# Patient Record
Sex: MALE | Race: BLACK OR AFRICAN AMERICAN | NOT HISPANIC OR LATINO | Employment: OTHER | RURAL
[De-identification: names, ages, dates, MRNs, and addresses within clinical notes are randomized per-mention and may not be internally consistent; named-entity substitution may affect disease eponyms.]

---

## 2017-10-26 ENCOUNTER — HISTORICAL (OUTPATIENT)
Dept: ADMINISTRATIVE | Facility: HOSPITAL | Age: 62
End: 2017-10-26

## 2017-10-27 LAB
LAB AP CLINICAL INFORMATION: NORMAL
LAB AP COMMENTS: NORMAL
LAB AP DIAGNOSIS - HISTORICAL: NORMAL
LAB AP GROSS PATHOLOGY - HISTORICAL: NORMAL
LAB AP SPECIMEN SUBMITTED - HISTORICAL: NORMAL

## 2018-01-02 ENCOUNTER — HISTORICAL (OUTPATIENT)
Dept: ADMINISTRATIVE | Facility: HOSPITAL | Age: 63
End: 2018-01-02

## 2018-01-04 LAB
LAB AP CAP CHECKLIST - HISTORICAL: NORMAL
LAB AP CLINICAL INFORMATION: NORMAL
LAB AP COMMENTS: NORMAL
LAB AP DIAGNOSIS - HISTORICAL: NORMAL
LAB AP GROSS PATHOLOGY - HISTORICAL: NORMAL
LAB AP SPECIMEN SUBMITTED - HISTORICAL: NORMAL

## 2019-06-19 ENCOUNTER — HISTORICAL (OUTPATIENT)
Dept: ADMINISTRATIVE | Facility: HOSPITAL | Age: 64
End: 2019-06-19

## 2019-06-19 LAB
CRC RECOMMENDATION EXT: NORMAL
CRC RECOMMENDATION EXT: NORMAL

## 2019-06-20 LAB
LAB AP CLINICAL INFORMATION: NORMAL
LAB AP DIAGNOSIS - HISTORICAL: NORMAL
LAB AP GROSS PATHOLOGY - HISTORICAL: NORMAL
LAB AP SPECIMEN SUBMITTED - HISTORICAL: NORMAL

## 2021-05-10 ENCOUNTER — OFFICE VISIT (OUTPATIENT)
Dept: UROLOGY | Facility: CLINIC | Age: 66
End: 2021-05-10
Payer: COMMERCIAL

## 2021-05-10 VITALS
SYSTOLIC BLOOD PRESSURE: 130 MMHG | HEIGHT: 69 IN | DIASTOLIC BLOOD PRESSURE: 78 MMHG | BODY MASS INDEX: 31.1 KG/M2 | HEART RATE: 81 BPM | OXYGEN SATURATION: 99 % | TEMPERATURE: 99 F | WEIGHT: 210 LBS

## 2021-05-10 DIAGNOSIS — R39.9 LOWER URINARY TRACT SYMPTOMS (LUTS): ICD-10-CM

## 2021-05-10 DIAGNOSIS — N52.1 ERECTILE DISORDER DUE TO MEDICAL CONDITION IN MALE: ICD-10-CM

## 2021-05-10 DIAGNOSIS — C61 PROSTATE CANCER: Primary | ICD-10-CM

## 2021-05-10 PROCEDURE — 99213 OFFICE O/P EST LOW 20 MIN: CPT | Mod: S$PBB,,, | Performed by: UROLOGY

## 2021-05-10 PROCEDURE — 99214 HC OFFICE/OUTPT VISIT, EST, LEVL IV, 30-39 MIN: ICD-10-PCS | Mod: PBBFAC,,, | Performed by: UROLOGY

## 2021-05-10 PROCEDURE — 99213 PR OFFICE/OUTPT VISIT, EST, LEVL III, 20-29 MIN: ICD-10-PCS | Mod: S$PBB,,, | Performed by: UROLOGY

## 2021-05-10 PROCEDURE — 99214 OFFICE O/P EST MOD 30 MIN: CPT | Mod: PBBFAC | Performed by: UROLOGY

## 2021-05-10 PROCEDURE — 99214 OFFICE O/P EST MOD 30 MIN: CPT | Mod: PBBFAC,,, | Performed by: UROLOGY

## 2021-05-10 RX ORDER — ROSUVASTATIN CALCIUM 10 MG/1
10 TABLET, COATED ORAL NIGHTLY
COMMUNITY
Start: 2021-03-01 | End: 2021-09-01 | Stop reason: SDUPTHER

## 2021-05-10 RX ORDER — HYDROCHLOROTHIAZIDE 25 MG/1
25 TABLET ORAL DAILY
COMMUNITY
Start: 2021-03-01 | End: 2021-09-01 | Stop reason: SDUPTHER

## 2021-05-10 RX ORDER — AMLODIPINE BESYLATE 10 MG/1
10 TABLET ORAL DAILY
COMMUNITY
Start: 2021-03-01 | End: 2021-09-01 | Stop reason: SDUPTHER

## 2021-05-13 ENCOUNTER — TELEPHONE (OUTPATIENT)
Dept: UROLOGY | Facility: CLINIC | Age: 66
End: 2021-05-13

## 2021-05-13 DIAGNOSIS — C61 PROSTATE CANCER: Primary | ICD-10-CM

## 2021-09-01 ENCOUNTER — OFFICE VISIT (OUTPATIENT)
Dept: FAMILY MEDICINE | Facility: CLINIC | Age: 66
End: 2021-09-01
Payer: COMMERCIAL

## 2021-09-01 VITALS
HEIGHT: 69 IN | TEMPERATURE: 97 F | WEIGHT: 218 LBS | BODY MASS INDEX: 32.29 KG/M2 | DIASTOLIC BLOOD PRESSURE: 78 MMHG | HEART RATE: 79 BPM | SYSTOLIC BLOOD PRESSURE: 133 MMHG | RESPIRATION RATE: 18 BRPM

## 2021-09-01 DIAGNOSIS — I10 ESSENTIAL HYPERTENSION: Primary | ICD-10-CM

## 2021-09-01 DIAGNOSIS — E78.5 HYPERLIPIDEMIA, UNSPECIFIED HYPERLIPIDEMIA TYPE: ICD-10-CM

## 2021-09-01 LAB
ALBUMIN SERPL BCP-MCNC: 4.2 G/DL (ref 3.5–5)
ALBUMIN/GLOB SERPL: 1.1 {RATIO}
ALP SERPL-CCNC: 109 U/L (ref 45–115)
ALT SERPL W P-5'-P-CCNC: 33 U/L (ref 16–61)
ANION GAP SERPL CALCULATED.3IONS-SCNC: 8 MMOL/L (ref 7–16)
AST SERPL W P-5'-P-CCNC: 24 U/L (ref 15–37)
BASOPHILS # BLD AUTO: 0.08 K/UL (ref 0–0.2)
BASOPHILS NFR BLD AUTO: 0.9 % (ref 0–1)
BILIRUB SERPL-MCNC: 0.7 MG/DL (ref 0–1.2)
BUN SERPL-MCNC: 13 MG/DL (ref 7–18)
BUN/CREAT SERPL: 14 (ref 6–20)
CALCIUM SERPL-MCNC: 8.7 MG/DL (ref 8.5–10.1)
CHLORIDE SERPL-SCNC: 106 MMOL/L (ref 98–107)
CHOLEST SERPL-MCNC: 126 MG/DL (ref 0–200)
CHOLEST/HDLC SERPL: 3.2 {RATIO}
CO2 SERPL-SCNC: 30 MMOL/L (ref 21–32)
CREAT SERPL-MCNC: 0.9 MG/DL (ref 0.7–1.3)
CREAT UR-MCNC: 272 MG/DL (ref 39–259)
DIFFERENTIAL METHOD BLD: ABNORMAL
EOSINOPHIL # BLD AUTO: 0.16 K/UL (ref 0–0.5)
EOSINOPHIL NFR BLD AUTO: 1.7 % (ref 1–4)
EOSINOPHIL NFR BLD MANUAL: 2 % (ref 1–4)
ERYTHROCYTE [DISTWIDTH] IN BLOOD BY AUTOMATED COUNT: 13.5 % (ref 11.5–14.5)
GLOBULIN SER-MCNC: 3.7 G/DL (ref 2–4)
GLUCOSE SERPL-MCNC: 119 MG/DL (ref 74–106)
HCT VFR BLD AUTO: 38.3 % (ref 40–54)
HDLC SERPL-MCNC: 39 MG/DL (ref 40–60)
HGB BLD-MCNC: 12 G/DL (ref 13.5–18)
IMM GRANULOCYTES # BLD AUTO: 0.02 K/UL (ref 0–0.04)
IMM GRANULOCYTES NFR BLD: 0.2 % (ref 0–0.4)
LDLC SERPL CALC-MCNC: 54 MG/DL
LDLC/HDLC SERPL: 1.4 {RATIO}
LYMPHOCYTES # BLD AUTO: 4.22 K/UL (ref 1–4.8)
LYMPHOCYTES NFR BLD AUTO: 45.8 % (ref 27–41)
LYMPHOCYTES NFR BLD MANUAL: 43 % (ref 27–41)
MCH RBC QN AUTO: 27.9 PG (ref 27–31)
MCHC RBC AUTO-ENTMCNC: 31.3 G/DL (ref 32–36)
MCV RBC AUTO: 89.1 FL (ref 80–96)
MICROALBUMIN UR-MCNC: 1.5 MG/DL (ref 0–2.8)
MICROALBUMIN/CREAT RATIO PNL UR: 5.5 MG/G (ref 0–30)
MONOCYTES # BLD AUTO: 0.49 K/UL (ref 0–0.8)
MONOCYTES NFR BLD AUTO: 5.3 % (ref 2–6)
MONOCYTES NFR BLD MANUAL: 7 % (ref 2–6)
MPC BLD CALC-MCNC: 11.2 FL (ref 9.4–12.4)
NEUTROPHILS # BLD AUTO: 4.24 K/UL (ref 1.8–7.7)
NEUTROPHILS NFR BLD AUTO: 46.1 % (ref 53–65)
NEUTS SEG NFR BLD MANUAL: 48 % (ref 50–62)
NONHDLC SERPL-MCNC: 87 MG/DL
NRBC # BLD AUTO: 0 X10E3/UL
NRBC, AUTO (.00): 0 %
PLATELET # BLD AUTO: 269 K/UL (ref 150–400)
PLATELET MORPHOLOGY: NORMAL
POTASSIUM SERPL-SCNC: 3.6 MMOL/L (ref 3.5–5.1)
PROT SERPL-MCNC: 7.9 G/DL (ref 6.4–8.2)
RBC # BLD AUTO: 4.3 M/UL (ref 4.6–6.2)
RBC MORPH BLD: NORMAL
REACTIVE LYMPHOCYTES: ABNORMAL
SODIUM SERPL-SCNC: 140 MMOL/L (ref 136–145)
TRIGL SERPL-MCNC: 165 MG/DL (ref 35–150)
VLDLC SERPL-MCNC: 33 MG/DL
WBC # BLD AUTO: 9.21 K/UL (ref 4.5–11)

## 2021-09-01 PROCEDURE — 82043 MICROALBUMIN / CREATININE RATIO URINE: ICD-10-PCS | Mod: QW,,, | Performed by: CLINICAL MEDICAL LABORATORY

## 2021-09-01 PROCEDURE — 85025 COMPLETE CBC W/AUTO DIFF WBC: CPT | Mod: QW,,, | Performed by: CLINICAL MEDICAL LABORATORY

## 2021-09-01 PROCEDURE — 99213 OFFICE O/P EST LOW 20 MIN: CPT | Mod: ,,, | Performed by: NURSE PRACTITIONER

## 2021-09-01 PROCEDURE — 82570 ASSAY OF URINE CREATININE: CPT | Mod: QW,,, | Performed by: CLINICAL MEDICAL LABORATORY

## 2021-09-01 PROCEDURE — 80061 LIPID PANEL: ICD-10-PCS | Mod: QW,,, | Performed by: CLINICAL MEDICAL LABORATORY

## 2021-09-01 PROCEDURE — 85025 CBC WITH DIFFERENTIAL: ICD-10-PCS | Mod: QW,,, | Performed by: CLINICAL MEDICAL LABORATORY

## 2021-09-01 PROCEDURE — 80053 COMPREHEN METABOLIC PANEL: CPT | Mod: QW,,, | Performed by: CLINICAL MEDICAL LABORATORY

## 2021-09-01 PROCEDURE — 82570 MICROALBUMIN / CREATININE RATIO URINE: ICD-10-PCS | Mod: QW,,, | Performed by: CLINICAL MEDICAL LABORATORY

## 2021-09-01 PROCEDURE — 80053 COMPREHENSIVE METABOLIC PANEL: ICD-10-PCS | Mod: QW,,, | Performed by: CLINICAL MEDICAL LABORATORY

## 2021-09-01 PROCEDURE — 80061 LIPID PANEL: CPT | Mod: QW,,, | Performed by: CLINICAL MEDICAL LABORATORY

## 2021-09-01 PROCEDURE — 99213 PR OFFICE/OUTPT VISIT, EST, LEVL III, 20-29 MIN: ICD-10-PCS | Mod: ,,, | Performed by: NURSE PRACTITIONER

## 2021-09-01 PROCEDURE — 82043 UR ALBUMIN QUANTITATIVE: CPT | Mod: QW,,, | Performed by: CLINICAL MEDICAL LABORATORY

## 2021-09-01 RX ORDER — ZOSTER VACCINE RECOMBINANT, ADJUVANTED 50 MCG/0.5
0.5 KIT INTRAMUSCULAR ONCE
Qty: 1 EACH | Refills: 0 | Status: SHIPPED | OUTPATIENT
Start: 2021-09-01 | End: 2021-09-01

## 2021-09-01 RX ORDER — ROSUVASTATIN CALCIUM 10 MG/1
10 TABLET, COATED ORAL NIGHTLY
Qty: 90 TABLET | Refills: 1 | Status: SHIPPED | OUTPATIENT
Start: 2021-09-01 | End: 2022-02-07 | Stop reason: SDUPTHER

## 2021-09-01 RX ORDER — AMLODIPINE BESYLATE 10 MG/1
10 TABLET ORAL DAILY
Qty: 90 TABLET | Refills: 1 | Status: SHIPPED | OUTPATIENT
Start: 2021-09-01 | End: 2022-02-07 | Stop reason: SDUPTHER

## 2021-09-01 RX ORDER — AMLODIPINE BESYLATE 10 MG/1
10 TABLET ORAL DAILY
Qty: 90 TABLET | Refills: 1 | Status: CANCELLED | OUTPATIENT
Start: 2021-09-01

## 2021-09-01 RX ORDER — HYDROCHLOROTHIAZIDE 25 MG/1
25 TABLET ORAL DAILY
Qty: 90 TABLET | Refills: 1 | Status: SHIPPED | OUTPATIENT
Start: 2021-09-01 | End: 2022-02-07 | Stop reason: SDUPTHER

## 2021-09-01 RX ORDER — HYDROCHLOROTHIAZIDE 25 MG/1
25 TABLET ORAL DAILY
Qty: 90 TABLET | Refills: 1 | Status: CANCELLED | OUTPATIENT
Start: 2021-09-01

## 2021-09-01 RX ORDER — ROSUVASTATIN CALCIUM 10 MG/1
10 TABLET, COATED ORAL NIGHTLY
Qty: 90 TABLET | Refills: 1 | Status: CANCELLED | OUTPATIENT
Start: 2021-09-01

## 2021-12-10 DIAGNOSIS — C61 MALIGNANT NEOPLASM OF PROSTATE: Primary | ICD-10-CM

## 2021-12-16 ENCOUNTER — OFFICE VISIT (OUTPATIENT)
Dept: UROLOGY | Facility: CLINIC | Age: 66
End: 2021-12-16

## 2021-12-16 VITALS
WEIGHT: 216 LBS | OXYGEN SATURATION: 99 % | SYSTOLIC BLOOD PRESSURE: 140 MMHG | HEART RATE: 81 BPM | DIASTOLIC BLOOD PRESSURE: 82 MMHG | TEMPERATURE: 99 F | HEIGHT: 69 IN | BODY MASS INDEX: 31.99 KG/M2

## 2021-12-16 DIAGNOSIS — N52.31 ERECTILE DYSFUNCTION AFTER RADICAL PROSTATECTOMY: ICD-10-CM

## 2021-12-16 DIAGNOSIS — C61 CANCER OF PROSTATE: Primary | ICD-10-CM

## 2021-12-16 PROCEDURE — 99214 OFFICE O/P EST MOD 30 MIN: CPT | Mod: PBBFAC | Performed by: NURSE PRACTITIONER

## 2021-12-16 PROCEDURE — 99214 PR OFFICE/OUTPT VISIT, EST, LEVL IV, 30-39 MIN: ICD-10-PCS | Mod: S$PBB,,, | Performed by: NURSE PRACTITIONER

## 2021-12-16 PROCEDURE — 51798 US URINE CAPACITY MEASURE: CPT | Mod: PBBFAC | Performed by: NURSE PRACTITIONER

## 2021-12-16 PROCEDURE — 99214 OFFICE O/P EST MOD 30 MIN: CPT | Mod: S$PBB,,, | Performed by: NURSE PRACTITIONER

## 2021-12-16 RX ORDER — SILDENAFIL 25 MG/1
TABLET, FILM COATED ORAL
Qty: 40 TABLET | Refills: 5 | Status: SHIPPED | OUTPATIENT
Start: 2021-12-16 | End: 2022-02-07 | Stop reason: SDUPTHER

## 2021-12-16 RX ORDER — CALCIUM CARBONATE 500(1250)
1 TABLET ORAL DAILY
Qty: 90 TABLET | Refills: 3 | Status: SHIPPED | OUTPATIENT
Start: 2021-12-16 | End: 2022-02-07 | Stop reason: SDUPTHER

## 2021-12-16 RX ORDER — VIT C/E/ZN/COPPR/LUTEIN/ZEAXAN 250MG-90MG
1000 CAPSULE ORAL DAILY
Qty: 90 CAPSULE | Refills: 3 | Status: SHIPPED | OUTPATIENT
Start: 2021-12-16 | End: 2022-02-07 | Stop reason: SDUPTHER

## 2022-02-07 ENCOUNTER — OFFICE VISIT (OUTPATIENT)
Dept: FAMILY MEDICINE | Facility: CLINIC | Age: 67
End: 2022-02-07
Payer: COMMERCIAL

## 2022-02-07 VITALS
RESPIRATION RATE: 18 BRPM | HEIGHT: 69 IN | DIASTOLIC BLOOD PRESSURE: 75 MMHG | WEIGHT: 222 LBS | BODY MASS INDEX: 32.88 KG/M2 | HEART RATE: 76 BPM | TEMPERATURE: 98 F | SYSTOLIC BLOOD PRESSURE: 136 MMHG

## 2022-02-07 DIAGNOSIS — R73.01 IMPAIRED FASTING GLUCOSE: ICD-10-CM

## 2022-02-07 DIAGNOSIS — N52.31 ERECTILE DYSFUNCTION AFTER RADICAL PROSTATECTOMY: ICD-10-CM

## 2022-02-07 DIAGNOSIS — C61 CANCER OF PROSTATE: ICD-10-CM

## 2022-02-07 DIAGNOSIS — E55.9 VITAMIN D DEFICIENCY: ICD-10-CM

## 2022-02-07 DIAGNOSIS — E78.5 HYPERLIPIDEMIA, UNSPECIFIED HYPERLIPIDEMIA TYPE: ICD-10-CM

## 2022-02-07 DIAGNOSIS — R53.83 FATIGUE, UNSPECIFIED TYPE: ICD-10-CM

## 2022-02-07 DIAGNOSIS — I10 ESSENTIAL HYPERTENSION: Primary | ICD-10-CM

## 2022-02-07 LAB
25(OH)D3 SERPL-MCNC: 30.3 NG/ML
ALBUMIN SERPL BCP-MCNC: 4.2 G/DL (ref 3.5–5)
ALBUMIN/GLOB SERPL: 1.1 {RATIO}
ALP SERPL-CCNC: 94 U/L (ref 45–115)
ALT SERPL W P-5'-P-CCNC: 33 U/L (ref 16–61)
ANION GAP SERPL CALCULATED.3IONS-SCNC: 8 MMOL/L (ref 7–16)
AST SERPL W P-5'-P-CCNC: 20 U/L (ref 15–37)
BASOPHILS # BLD AUTO: 0.08 K/UL (ref 0–0.2)
BASOPHILS NFR BLD AUTO: 0.8 % (ref 0–1)
BILIRUB SERPL-MCNC: 0.6 MG/DL (ref 0–1.2)
BUN SERPL-MCNC: 15 MG/DL (ref 7–18)
BUN/CREAT SERPL: 16 (ref 6–20)
CALCIUM SERPL-MCNC: 9.3 MG/DL (ref 8.5–10.1)
CHLORIDE SERPL-SCNC: 105 MMOL/L (ref 98–107)
CHOLEST SERPL-MCNC: 135 MG/DL (ref 0–200)
CHOLEST/HDLC SERPL: 3.4 {RATIO}
CO2 SERPL-SCNC: 30 MMOL/L (ref 21–32)
CREAT SERPL-MCNC: 0.96 MG/DL (ref 0.7–1.3)
DIFFERENTIAL METHOD BLD: ABNORMAL
EOSINOPHIL # BLD AUTO: 0.18 K/UL (ref 0–0.5)
EOSINOPHIL NFR BLD AUTO: 1.8 % (ref 1–4)
ERYTHROCYTE [DISTWIDTH] IN BLOOD BY AUTOMATED COUNT: 13.9 % (ref 11.5–14.5)
EST. AVERAGE GLUCOSE BLD GHB EST-MCNC: 110 MG/DL
GLOBULIN SER-MCNC: 4 G/DL (ref 2–4)
GLUCOSE SERPL-MCNC: 97 MG/DL (ref 74–106)
HBA1C MFR BLD HPLC: 5.9 % (ref 4.5–6.6)
HCT VFR BLD AUTO: 41.3 % (ref 40–54)
HDLC SERPL-MCNC: 40 MG/DL (ref 40–60)
HGB BLD-MCNC: 12.9 G/DL (ref 13.5–18)
IMM GRANULOCYTES # BLD AUTO: 0.02 K/UL (ref 0–0.04)
IMM GRANULOCYTES NFR BLD: 0.2 % (ref 0–0.4)
LDLC SERPL CALC-MCNC: 68 MG/DL
LDLC/HDLC SERPL: 1.7 {RATIO}
LYMPHOCYTES # BLD AUTO: 4.04 K/UL (ref 1–4.8)
LYMPHOCYTES NFR BLD AUTO: 40.6 % (ref 27–41)
MCH RBC QN AUTO: 28.5 PG (ref 27–31)
MCHC RBC AUTO-ENTMCNC: 31.2 G/DL (ref 32–36)
MCV RBC AUTO: 91.2 FL (ref 80–96)
MONOCYTES # BLD AUTO: 0.55 K/UL (ref 0–0.8)
MONOCYTES NFR BLD AUTO: 5.5 % (ref 2–6)
MPC BLD CALC-MCNC: 11.2 FL (ref 9.4–12.4)
NEUTROPHILS # BLD AUTO: 5.09 K/UL (ref 1.8–7.7)
NEUTROPHILS NFR BLD AUTO: 51.1 % (ref 53–65)
NONHDLC SERPL-MCNC: 95 MG/DL
NRBC # BLD AUTO: 0 X10E3/UL
NRBC, AUTO (.00): 0 %
PLATELET # BLD AUTO: 279 K/UL (ref 150–400)
POTASSIUM SERPL-SCNC: 3.7 MMOL/L (ref 3.5–5.1)
PROT SERPL-MCNC: 8.2 G/DL (ref 6.4–8.2)
RBC # BLD AUTO: 4.53 M/UL (ref 4.6–6.2)
SODIUM SERPL-SCNC: 139 MMOL/L (ref 136–145)
TRIGL SERPL-MCNC: 137 MG/DL (ref 35–150)
TSH SERPL DL<=0.005 MIU/L-ACNC: 0.79 UIU/ML (ref 0.36–3.74)
VLDLC SERPL-MCNC: 27 MG/DL
WBC # BLD AUTO: 9.96 K/UL (ref 4.5–11)

## 2022-02-07 PROCEDURE — 99214 PR OFFICE/OUTPT VISIT, EST, LEVL IV, 30-39 MIN: ICD-10-PCS | Mod: ,,, | Performed by: NURSE PRACTITIONER

## 2022-02-07 PROCEDURE — 3008F PR BODY MASS INDEX (BMI) DOCUMENTED: ICD-10-PCS | Mod: ,,, | Performed by: NURSE PRACTITIONER

## 2022-02-07 PROCEDURE — 82043 MICROALBUMIN / CREATININE RATIO URINE: ICD-10-PCS | Mod: ,,, | Performed by: CLINICAL MEDICAL LABORATORY

## 2022-02-07 PROCEDURE — 84443 TSH: ICD-10-PCS | Mod: ,,, | Performed by: CLINICAL MEDICAL LABORATORY

## 2022-02-07 PROCEDURE — 3288F FALL RISK ASSESSMENT DOCD: CPT | Mod: ,,, | Performed by: NURSE PRACTITIONER

## 2022-02-07 PROCEDURE — 3075F SYST BP GE 130 - 139MM HG: CPT | Mod: ,,, | Performed by: NURSE PRACTITIONER

## 2022-02-07 PROCEDURE — 82570 ASSAY OF URINE CREATININE: CPT | Mod: ,,, | Performed by: CLINICAL MEDICAL LABORATORY

## 2022-02-07 PROCEDURE — 3066F NEPHROPATHY DOC TX: CPT | Mod: ,,, | Performed by: NURSE PRACTITIONER

## 2022-02-07 PROCEDURE — 80053 COMPREHEN METABOLIC PANEL: CPT | Mod: ,,, | Performed by: CLINICAL MEDICAL LABORATORY

## 2022-02-07 PROCEDURE — 80061 LIPID PANEL: ICD-10-PCS | Mod: ,,, | Performed by: CLINICAL MEDICAL LABORATORY

## 2022-02-07 PROCEDURE — 82306 VITAMIN D: ICD-10-PCS | Mod: ,,, | Performed by: CLINICAL MEDICAL LABORATORY

## 2022-02-07 PROCEDURE — 1160F RVW MEDS BY RX/DR IN RCRD: CPT | Mod: ,,, | Performed by: NURSE PRACTITIONER

## 2022-02-07 PROCEDURE — 84443 ASSAY THYROID STIM HORMONE: CPT | Mod: ,,, | Performed by: CLINICAL MEDICAL LABORATORY

## 2022-02-07 PROCEDURE — 1159F PR MEDICATION LIST DOCUMENTED IN MEDICAL RECORD: ICD-10-PCS | Mod: ,,, | Performed by: NURSE PRACTITIONER

## 2022-02-07 PROCEDURE — 85025 CBC WITH DIFFERENTIAL: ICD-10-PCS | Mod: ,,, | Performed by: CLINICAL MEDICAL LABORATORY

## 2022-02-07 PROCEDURE — 1101F PT FALLS ASSESS-DOCD LE1/YR: CPT | Mod: ,,, | Performed by: NURSE PRACTITIONER

## 2022-02-07 PROCEDURE — 82570 MICROALBUMIN / CREATININE RATIO URINE: ICD-10-PCS | Mod: ,,, | Performed by: CLINICAL MEDICAL LABORATORY

## 2022-02-07 PROCEDURE — 80053 COMPREHENSIVE METABOLIC PANEL: ICD-10-PCS | Mod: ,,, | Performed by: CLINICAL MEDICAL LABORATORY

## 2022-02-07 PROCEDURE — 85025 COMPLETE CBC W/AUTO DIFF WBC: CPT | Mod: ,,, | Performed by: CLINICAL MEDICAL LABORATORY

## 2022-02-07 PROCEDURE — 3078F DIAST BP <80 MM HG: CPT | Mod: ,,, | Performed by: NURSE PRACTITIONER

## 2022-02-07 PROCEDURE — 3044F PR MOST RECENT HEMOGLOBIN A1C LEVEL <7.0%: ICD-10-PCS | Mod: ,,, | Performed by: NURSE PRACTITIONER

## 2022-02-07 PROCEDURE — 3061F NEG MICROALBUMINURIA REV: CPT | Mod: ,,, | Performed by: NURSE PRACTITIONER

## 2022-02-07 PROCEDURE — 80061 LIPID PANEL: CPT | Mod: ,,, | Performed by: CLINICAL MEDICAL LABORATORY

## 2022-02-07 PROCEDURE — 83036 HEMOGLOBIN GLYCOSYLATED A1C: CPT | Mod: ,,, | Performed by: CLINICAL MEDICAL LABORATORY

## 2022-02-07 PROCEDURE — 1159F MED LIST DOCD IN RCRD: CPT | Mod: ,,, | Performed by: NURSE PRACTITIONER

## 2022-02-07 PROCEDURE — 3288F PR FALLS RISK ASSESSMENT DOCUMENTED: ICD-10-PCS | Mod: ,,, | Performed by: NURSE PRACTITIONER

## 2022-02-07 PROCEDURE — 83036 HEMOGLOBIN A1C: ICD-10-PCS | Mod: ,,, | Performed by: CLINICAL MEDICAL LABORATORY

## 2022-02-07 PROCEDURE — 3044F HG A1C LEVEL LT 7.0%: CPT | Mod: ,,, | Performed by: NURSE PRACTITIONER

## 2022-02-07 PROCEDURE — 3078F PR MOST RECENT DIASTOLIC BLOOD PRESSURE < 80 MM HG: ICD-10-PCS | Mod: ,,, | Performed by: NURSE PRACTITIONER

## 2022-02-07 PROCEDURE — 3075F PR MOST RECENT SYSTOLIC BLOOD PRESS GE 130-139MM HG: ICD-10-PCS | Mod: ,,, | Performed by: NURSE PRACTITIONER

## 2022-02-07 PROCEDURE — 99214 OFFICE O/P EST MOD 30 MIN: CPT | Mod: ,,, | Performed by: NURSE PRACTITIONER

## 2022-02-07 PROCEDURE — 82043 UR ALBUMIN QUANTITATIVE: CPT | Mod: ,,, | Performed by: CLINICAL MEDICAL LABORATORY

## 2022-02-07 PROCEDURE — 3061F PR NEG MICROALBUMINURIA RESULT DOCUMENTED/REVIEW: ICD-10-PCS | Mod: ,,, | Performed by: NURSE PRACTITIONER

## 2022-02-07 PROCEDURE — 3066F PR DOCUMENTATION OF TREATMENT FOR NEPHROPATHY: ICD-10-PCS | Mod: ,,, | Performed by: NURSE PRACTITIONER

## 2022-02-07 PROCEDURE — 1160F PR REVIEW ALL MEDS BY PRESCRIBER/CLIN PHARMACIST DOCUMENTED: ICD-10-PCS | Mod: ,,, | Performed by: NURSE PRACTITIONER

## 2022-02-07 PROCEDURE — 1101F PR PT FALLS ASSESS DOC 0-1 FALLS W/OUT INJ PAST YR: ICD-10-PCS | Mod: ,,, | Performed by: NURSE PRACTITIONER

## 2022-02-07 PROCEDURE — 3008F BODY MASS INDEX DOCD: CPT | Mod: ,,, | Performed by: NURSE PRACTITIONER

## 2022-02-07 PROCEDURE — 82306 VITAMIN D 25 HYDROXY: CPT | Mod: ,,, | Performed by: CLINICAL MEDICAL LABORATORY

## 2022-02-07 RX ORDER — AMLODIPINE BESYLATE 10 MG/1
10 TABLET ORAL DAILY
Qty: 90 TABLET | Refills: 1 | Status: SHIPPED | OUTPATIENT
Start: 2022-02-07 | End: 2022-07-21 | Stop reason: SDUPTHER

## 2022-02-07 RX ORDER — CALCIUM CARBONATE 500(1250)
1 TABLET ORAL DAILY
Qty: 90 TABLET | Refills: 3 | Status: SHIPPED | OUTPATIENT
Start: 2022-02-07 | End: 2022-07-21 | Stop reason: SDUPTHER

## 2022-02-07 RX ORDER — ROSUVASTATIN CALCIUM 10 MG/1
10 TABLET, COATED ORAL NIGHTLY
Qty: 90 TABLET | Refills: 1 | Status: SHIPPED | OUTPATIENT
Start: 2022-02-07 | End: 2022-07-21 | Stop reason: SDUPTHER

## 2022-02-07 RX ORDER — HYDROCHLOROTHIAZIDE 25 MG/1
25 TABLET ORAL DAILY
Qty: 90 TABLET | Refills: 1 | Status: SHIPPED | OUTPATIENT
Start: 2022-02-07 | End: 2022-07-21 | Stop reason: SDUPTHER

## 2022-02-07 RX ORDER — SILDENAFIL 25 MG/1
TABLET, FILM COATED ORAL
Qty: 40 TABLET | Refills: 5 | Status: SHIPPED | OUTPATIENT
Start: 2022-02-07 | End: 2022-07-21 | Stop reason: SDUPTHER

## 2022-02-07 RX ORDER — VIT C/E/ZN/COPPR/LUTEIN/ZEAXAN 250MG-90MG
1000 CAPSULE ORAL DAILY
Qty: 90 CAPSULE | Refills: 3 | Status: SHIPPED | OUTPATIENT
Start: 2022-02-07 | End: 2022-05-08

## 2022-02-07 NOTE — ASSESSMENT & PLAN NOTE
Lab Results   Component Value Date    CHOL 126 09/01/2021    TRIG 165 (H) 09/01/2021    HDL 39 (L) 09/01/2021    LDLCALC 54 09/01/2021    CHOLHDL 3.2 09/01/2021    NONHDLCHOL 87 09/01/2021       Lab Results   Component Value Date    CHOL 126 09/01/2021    TRIG 165 (H) 09/01/2021    HDL 39 (L) 09/01/2021    LDLCALC 54 09/01/2021    CHOLHDL 3.2 09/01/2021    NONHDLCHOL 87 09/01/2021

## 2022-02-07 NOTE — PROGRESS NOTES
ÁNGEL Benitez   [address] [place] [address]     PATIENT NAME: Kory Dogulas  : 1955  DATE: 22  MRN: [id number]      Billing Provider: ÁNGEL Benitez  Level of Service: SD OFFICE/OUTPT VISIT, EST, LEVL IV, 30-39 MIN  Patient PCP Information     Provider PCP Type    ÁNGEL Benitez General          Reason for Visit / Chief Complaint: Follow-up (6 month follow up need meds refill)       Update PCP  Update Chief Complaint         History of Present Illness / Problem Focused Workflow     Kory Douglas presents to the clinic with Follow-up (6 month follow up need meds refill)     HPI    Review of Systems     Review of Systems   Constitutional: Negative for chills, diaphoresis, fatigue and fever.   HENT: Negative for nasal congestion, dental problem, ear pain and postnasal drip.    Eyes: Negative for visual disturbance.   Respiratory: Negative for shortness of breath and wheezing.    Cardiovascular: Negative for chest pain and palpitations.   Gastrointestinal: Negative for abdominal distention and abdominal pain.   Endocrine: Negative for cold intolerance and heat intolerance.   Genitourinary: Negative for enuresis.   Musculoskeletal: Negative for neck stiffness.   Integumentary:  Negative for pallor and rash.   Neurological: Negative for dizziness, seizures, speech difficulty and weakness.   Psychiatric/Behavioral: Negative for agitation.        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 The patient's tumor invades the bladder neck in the resection margins are positive areas. did not get XRT.    Erectile dysfunction after radical prostatectomy 2021    No meds tried to date Reviewed all methods of ED treatment    Hyperlipidemia     Hypertension        History reviewed. No pertinent surgical history.    Social  "History    reports that he has never smoked. He has never used smokeless tobacco. He reports that he does not drink alcohol and does not use drugs.    Family History  MrBarbara's family history includes No Known Problems in his father and mother.    Medications and Allergies     Medications  No outpatient medications have been marked as taking for the 2/7/22 encounter (Office Visit) with ÁNGEL Benitez.       Allergies  Review of patient's allergies indicates:  No Known Allergies    Physical Examination   /75 (BP Location: Left arm, Patient Position: Sitting, BP Method: Medium (Automatic))   Pulse 76   Temp 98.1 °F (36.7 °C) (Temporal)   Resp 18   Ht 5' 9" (1.753 m)   Wt 100.7 kg (222 lb)   BMI 32.78 kg/m²   Physical Exam  Vitals and nursing note reviewed.   Constitutional:       Appearance: Normal appearance.   HENT:      Head: Normocephalic and atraumatic.      Right Ear: External ear normal.      Left Ear: External ear normal.      Nose: Nose normal.      Mouth/Throat:      Mouth: Mucous membranes are moist.      Pharynx: Oropharynx is clear.   Eyes:      Pupils: Pupils are equal, round, and reactive to light.   Cardiovascular:      Rate and Rhythm: Normal rate and regular rhythm.      Pulses: Normal pulses.      Heart sounds: Normal heart sounds. No murmur heard.  No gallop.    Pulmonary:      Effort: Pulmonary effort is normal.      Breath sounds: Normal breath sounds. No wheezing or rales.   Abdominal:      General: Abdomen is flat. Bowel sounds are normal. There is no distension.      Palpations: Abdomen is soft.      Tenderness: There is no abdominal tenderness.   Musculoskeletal:         General: Normal range of motion.      Cervical back: Normal range of motion and neck supple.   Skin:     General: Skin is warm and dry.      Capillary Refill: Capillary refill takes less than 2 seconds.   Neurological:      General: No focal deficit present.      Mental Status: He is alert and oriented to " person, place, and time.   Psychiatric:         Mood and Affect: Mood normal.         Behavior: Behavior normal.          Assessment and Plan (including Health Maintenance)      Problem List  Smart Sets  Document Outside HM   :    Plan:         Health Maintenance Due   Topic Date Due    TETANUS VACCINE  Never done    Shingles Vaccine (1 of 2) Never done    Pneumococcal Vaccines (Age 65+) (2 of 2 - PCV13) 08/27/2020    Influenza Vaccine (1) Never done    COVID-19 Vaccine (3 - Booster for Moderna series) 10/15/2021       Problem List Items Addressed This Visit        Cardiac/Vascular    Essential hypertension - Primary    Current Assessment & Plan     Blood Pressure Trends are:  BP Readings from Last 6 Encounters:   02/07/22 136/75   12/16/21 (!) 140/82   09/01/21 133/78   05/10/21 130/78       Most Recent Chemistry:  CMP  Lab Results   Component Value Date     09/01/2021    K 3.6 09/01/2021     09/01/2021    CO2 30 09/01/2021     (H) 09/01/2021    BUN 13 09/01/2021    CREATININE 0.90 09/01/2021    CALCIUM 8.7 09/01/2021    PROT 7.9 09/01/2021    ALBUMIN 4.2 09/01/2021    BILITOT 0.7 09/01/2021    ALKPHOS 109 09/01/2021    AST 24 09/01/2021    ALT 33 09/01/2021    ANIONGAP 8 09/01/2021    ESTGFRAFRICA 108 09/01/2021      Blood Pressure Trends are:  BP Readings from Last 6 Encounters:   02/07/22 136/75   12/16/21 (!) 140/82   09/01/21 133/78   05/10/21 130/78       Most Recent Chemistry:  CMP  Lab Results   Component Value Date     09/01/2021    K 3.6 09/01/2021     09/01/2021    CO2 30 09/01/2021     (H) 09/01/2021    BUN 13 09/01/2021    CREATININE 0.90 09/01/2021    CALCIUM 8.7 09/01/2021    PROT 7.9 09/01/2021    ALBUMIN 4.2 09/01/2021    BILITOT 0.7 09/01/2021    ALKPHOS 109 09/01/2021    AST 24 09/01/2021    ALT 33 09/01/2021    ANIONGAP 8 09/01/2021    ESTGFRAFRICA 108 09/01/2021               Relevant Medications    amLODIPine (NORVASC) 10 MG tablet     hydroCHLOROthiazide (HYDRODIURIL) 25 MG tablet    Other Relevant Orders    Comprehensive Metabolic Panel    CBC Auto Differential    Microalbumin/Creatinine Ratio, Urine    Hyperlipidemia    Current Assessment & Plan       Lab Results   Component Value Date    CHOL 126 09/01/2021    TRIG 165 (H) 09/01/2021    HDL 39 (L) 09/01/2021    LDLCALC 54 09/01/2021    CHOLHDL 3.2 09/01/2021    NONHDLCHOL 87 09/01/2021       Lab Results   Component Value Date    CHOL 126 09/01/2021    TRIG 165 (H) 09/01/2021    HDL 39 (L) 09/01/2021    LDLCALC 54 09/01/2021    CHOLHDL 3.2 09/01/2021    NONHDLCHOL 87 09/01/2021            Relevant Medications    rosuvastatin (CRESTOR) 10 MG tablet    Other Relevant Orders    Lipid Panel       Oncology    Cancer of prostate    Overview     ·  radical prostatectomy for Alexandria's 4+4 disease and tertiary pattern of Alexandria's 5 disease. The patient's tumor invades the bladder neck in the resection margins are positive areas.  · did not get XRT.         Relevant Medications    calcium carbonate (OS-SOL) 500 mg calcium (1,250 mg) tablet    cholecalciferol, vitamin D3, (VITAMIN D3) 25 mcg (1,000 unit) capsule       Endocrine    Vitamin D deficiency    Relevant Orders    Vitamin D    Impaired fasting glucose    Relevant Orders    Hemoglobin A1C       Other    Erectile dysfunction after radical prostatectomy (Chronic)    Overview     · No meds tried to date  · Reviewed all methods of ED treatment  · Not currently taking nitrates.         Relevant Medications    sildenafiL (VIAGRA) 25 MG tablet    Fatigue    Relevant Orders    TSH          Health Maintenance Topics with due status: Not Due       Topic Last Completion Date    Colorectal Cancer Screening 08/13/2018    Lipid Panel 09/01/2021       Future Appointments   Date Time Provider Department Center   5/9/2022  1:00 PM AWV NURSE, [facility] FAMILY MEDICINE [facility]   6/17/2022  8:30 AM [facility] [address] [place] [address] Main    6/17/2022  11:00 AM [facility] Main    6/21/2022  8:00 AM ÁNGEL Alba [facility]        Follow up in about 3 months (around 5/7/2022), or if symptoms worsen or fail to improve.        Signature:  ÁNGEL Benitez      [address] [place] [address]    Date of encounter: 2/7/22

## 2022-02-07 NOTE — ASSESSMENT & PLAN NOTE
Blood Pressure Trends are:  BP Readings from Last 6 Encounters:   02/07/22 136/75   12/16/21 (!) 140/82   09/01/21 133/78   05/10/21 130/78       Most Recent Chemistry:  CMP  Lab Results   Component Value Date     09/01/2021    K 3.6 09/01/2021     09/01/2021    CO2 30 09/01/2021     (H) 09/01/2021    BUN 13 09/01/2021    CREATININE 0.90 09/01/2021    CALCIUM 8.7 09/01/2021    PROT 7.9 09/01/2021    ALBUMIN 4.2 09/01/2021    BILITOT 0.7 09/01/2021    ALKPHOS 109 09/01/2021    AST 24 09/01/2021    ALT 33 09/01/2021    ANIONGAP 8 09/01/2021    ESTGFRAFRICA 108 09/01/2021      Blood Pressure Trends are:  BP Readings from Last 6 Encounters:   02/07/22 136/75   12/16/21 (!) 140/82   09/01/21 133/78   05/10/21 130/78       Most Recent Chemistry:  CMP  Lab Results   Component Value Date     09/01/2021    K 3.6 09/01/2021     09/01/2021    CO2 30 09/01/2021     (H) 09/01/2021    BUN 13 09/01/2021    CREATININE 0.90 09/01/2021    CALCIUM 8.7 09/01/2021    PROT 7.9 09/01/2021    ALBUMIN 4.2 09/01/2021    BILITOT 0.7 09/01/2021    ALKPHOS 109 09/01/2021    AST 24 09/01/2021    ALT 33 09/01/2021    ANIONGAP 8 09/01/2021    ESTGFRAFRICA 108 09/01/2021

## 2022-02-08 LAB
CREAT UR-MCNC: 100 MG/DL (ref 39–259)
MICROALBUMIN UR-MCNC: 0.7 MG/DL (ref 0–2.8)
MICROALBUMIN/CREAT RATIO PNL UR: 7 MG/G (ref 0–30)

## 2022-05-03 ENCOUNTER — PATIENT OUTREACH (OUTPATIENT)
Dept: FAMILY MEDICINE | Facility: CLINIC | Age: 67
End: 2022-05-03
Payer: COMMERCIAL

## 2022-05-09 ENCOUNTER — OFFICE VISIT (OUTPATIENT)
Dept: FAMILY MEDICINE | Facility: CLINIC | Age: 67
End: 2022-05-09
Payer: COMMERCIAL

## 2022-05-09 VITALS
OXYGEN SATURATION: 98 % | HEIGHT: 69 IN | SYSTOLIC BLOOD PRESSURE: 138 MMHG | TEMPERATURE: 98 F | HEART RATE: 75 BPM | BODY MASS INDEX: 32.14 KG/M2 | DIASTOLIC BLOOD PRESSURE: 70 MMHG | WEIGHT: 217 LBS | RESPIRATION RATE: 20 BRPM

## 2022-05-09 DIAGNOSIS — E66.3 OVERWEIGHT: ICD-10-CM

## 2022-05-09 DIAGNOSIS — I10 ESSENTIAL HYPERTENSION: Primary | ICD-10-CM

## 2022-05-09 DIAGNOSIS — E78.5 HYPERLIPIDEMIA, UNSPECIFIED HYPERLIPIDEMIA TYPE: ICD-10-CM

## 2022-05-09 DIAGNOSIS — N52.31 ERECTILE DYSFUNCTION AFTER RADICAL PROSTATECTOMY: ICD-10-CM

## 2022-05-09 DIAGNOSIS — Z00.00 ENCOUNTER FOR PREVENTIVE HEALTH EXAMINATION: ICD-10-CM

## 2022-05-09 PROCEDURE — 3078F PR MOST RECENT DIASTOLIC BLOOD PRESSURE < 80 MM HG: ICD-10-PCS | Mod: ,,, | Performed by: NURSE PRACTITIONER

## 2022-05-09 PROCEDURE — 1101F PR PT FALLS ASSESS DOC 0-1 FALLS W/OUT INJ PAST YR: ICD-10-PCS | Mod: ,,, | Performed by: NURSE PRACTITIONER

## 2022-05-09 PROCEDURE — 3075F SYST BP GE 130 - 139MM HG: CPT | Mod: ,,, | Performed by: NURSE PRACTITIONER

## 2022-05-09 PROCEDURE — 1159F PR MEDICATION LIST DOCUMENTED IN MEDICAL RECORD: ICD-10-PCS | Mod: ,,, | Performed by: NURSE PRACTITIONER

## 2022-05-09 PROCEDURE — 1160F PR REVIEW ALL MEDS BY PRESCRIBER/CLIN PHARMACIST DOCUMENTED: ICD-10-PCS | Mod: ,,, | Performed by: NURSE PRACTITIONER

## 2022-05-09 PROCEDURE — G0009 ADMIN PNEUMOCOCCAL VACCINE: HCPCS | Mod: ,,, | Performed by: NURSE PRACTITIONER

## 2022-05-09 PROCEDURE — 3008F BODY MASS INDEX DOCD: CPT | Mod: ,,, | Performed by: NURSE PRACTITIONER

## 2022-05-09 PROCEDURE — 3066F NEPHROPATHY DOC TX: CPT | Mod: ,,, | Performed by: NURSE PRACTITIONER

## 2022-05-09 PROCEDURE — G0009 PNEUMOCOCCAL POLYSACCHARIDE VACCINE 23-VALENT =>2YO SQ IM: ICD-10-PCS | Mod: ,,, | Performed by: NURSE PRACTITIONER

## 2022-05-09 PROCEDURE — 3288F PR FALLS RISK ASSESSMENT DOCUMENTED: ICD-10-PCS | Mod: ,,, | Performed by: NURSE PRACTITIONER

## 2022-05-09 PROCEDURE — 3288F FALL RISK ASSESSMENT DOCD: CPT | Mod: ,,, | Performed by: NURSE PRACTITIONER

## 2022-05-09 PROCEDURE — 1160F RVW MEDS BY RX/DR IN RCRD: CPT | Mod: ,,, | Performed by: NURSE PRACTITIONER

## 2022-05-09 PROCEDURE — 1126F AMNT PAIN NOTED NONE PRSNT: CPT | Mod: ,,, | Performed by: NURSE PRACTITIONER

## 2022-05-09 PROCEDURE — 1101F PT FALLS ASSESS-DOCD LE1/YR: CPT | Mod: ,,, | Performed by: NURSE PRACTITIONER

## 2022-05-09 PROCEDURE — 1126F PR PAIN SEVERITY QUANTIFIED, NO PAIN PRESENT: ICD-10-PCS | Mod: ,,, | Performed by: NURSE PRACTITIONER

## 2022-05-09 PROCEDURE — 3078F DIAST BP <80 MM HG: CPT | Mod: ,,, | Performed by: NURSE PRACTITIONER

## 2022-05-09 PROCEDURE — 3075F PR MOST RECENT SYSTOLIC BLOOD PRESS GE 130-139MM HG: ICD-10-PCS | Mod: ,,, | Performed by: NURSE PRACTITIONER

## 2022-05-09 PROCEDURE — 1159F MED LIST DOCD IN RCRD: CPT | Mod: ,,, | Performed by: NURSE PRACTITIONER

## 2022-05-09 PROCEDURE — 3066F PR DOCUMENTATION OF TREATMENT FOR NEPHROPATHY: ICD-10-PCS | Mod: ,,, | Performed by: NURSE PRACTITIONER

## 2022-05-09 PROCEDURE — 3061F PR NEG MICROALBUMINURIA RESULT DOCUMENTED/REVIEW: ICD-10-PCS | Mod: ,,, | Performed by: NURSE PRACTITIONER

## 2022-05-09 PROCEDURE — 3008F PR BODY MASS INDEX (BMI) DOCUMENTED: ICD-10-PCS | Mod: ,,, | Performed by: NURSE PRACTITIONER

## 2022-05-09 PROCEDURE — 3044F HG A1C LEVEL LT 7.0%: CPT | Mod: ,,, | Performed by: NURSE PRACTITIONER

## 2022-05-09 PROCEDURE — 3044F PR MOST RECENT HEMOGLOBIN A1C LEVEL <7.0%: ICD-10-PCS | Mod: ,,, | Performed by: NURSE PRACTITIONER

## 2022-05-09 PROCEDURE — 90732 PPSV23 VACC 2 YRS+ SUBQ/IM: CPT | Mod: ,,, | Performed by: NURSE PRACTITIONER

## 2022-05-09 PROCEDURE — 3061F NEG MICROALBUMINURIA REV: CPT | Mod: ,,, | Performed by: NURSE PRACTITIONER

## 2022-05-09 PROCEDURE — G0439 PPPS, SUBSEQ VISIT: HCPCS | Mod: ,,, | Performed by: NURSE PRACTITIONER

## 2022-05-09 PROCEDURE — G0439 PR MEDICARE ANNUAL WELLNESS SUBSEQUENT VISIT: ICD-10-PCS | Mod: ,,, | Performed by: NURSE PRACTITIONER

## 2022-05-09 PROCEDURE — 90732 PNEUMOCOCCAL POLYSACCHARIDE VACCINE 23-VALENT =>2YO SQ IM: ICD-10-PCS | Mod: ,,, | Performed by: NURSE PRACTITIONER

## 2022-05-09 NOTE — PROGRESS NOTES
[place] [facility]       PATIENT NAME: Kory Douglas   : 1955    AGE: 66 y.o. DATE: 2022   MRN: [id number]        Reason for Visit / Chief Complaint: Medicare AWV Follow Up ([facility] WELLNESS SUBSEQUENT VISIT)        Kory Dogulas presents for an Subsequent Medicare AWV today.     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 The patient's tumor invades the bladder neck in the resection margins are positive areas. did not get XRT.    Erectile dysfunction after radical prostatectomy 2021    No meds tried to date Reviewed all methods of ED treatment    Hyperlipidemia     Hypertension         Family History   Problem Relation Age of Onset    No Known Problems Mother     No Known Problems Father         Social History     Tobacco Use   Smoking Status Never Smoker   Smokeless Tobacco Never Used      Social History     Substance and Sexual Activity   Alcohol Use Never       Family History   Problem Relation Age of Onset    No Known Problems Mother     No Known Problems Father        Past Surgical History:   Procedure Laterality Date    ADENOIDECTOMY      PROSTATE SURGERY           · Allergies and Current Medications   Review of patient's allergies indicates:  No Known Allergies    Current Outpatient Medications:     amLODIPine (NORVASC) 10 MG tablet, Take 1 tablet (10 mg total) by mouth once daily., Disp: 90 tablet, Rfl: 1    calcium carbonate (OS-SOL) 500 mg calcium (1,250 mg) tablet, Take 1 tablet (500 mg total) by mouth once daily., Disp: 90 tablet, Rfl: 3    hydroCHLOROthiazide (HYDRODIURIL) 25 MG tablet, Take 1 tablet (25 mg total) by mouth once daily., Disp: 90 tablet, Rfl: 1    rosuvastatin (CRESTOR) 10 MG tablet, Take 1  tablet (10 mg total) by mouth every evening., Disp: 90 tablet, Rfl: 1    sildenafiL (VIAGRA) 25 MG tablet, Take 1-5 tabs (25mg - 100 mg) po one hour prior to intercourse on an empty stomach.  Use lowest effective dose., Disp: 40 tablet, Rfl: 5    · Health Risk Assessment   Fall Risk: No   Obesity: BMI 32.05     Advance Directive:  Does not have an advanced directive. Verbal education and written education included in today's AVS.    X Patient is interested in learning more about how to make advanced directives.  I provided them paperwork and offered to discuss this with them.   Depression: PHQ9 -0    HTN: 138/70    T2DM: A1C- 5.9   Tobacco use: Denies tobacco use  STI: Not at risk    Alcohol misuse: Denies alcohol use Cage Score: N/A   Statin Use: Continue statin use. Encouraged heart healthy diet & exercise   Mini Cog: 3/5      · Health Risk Assessment  What is your age?: 65-69  Are you male or female?: Male  During the past four weeks, how much have you been bothered by emotional problems such as feeling anxious, depressed, irritable, sad, or downhearted and blue?: Not at all  During the past five weeks, has your physical and/or emotional health limited your social activities with family, friends, neighbors, or groups?: Not at all  During the past four weeks, how much bodily pain have you generally had?: No pain  During the past four weeks, was someone available to help if you needed and wanted help?: Yes, as much as I wanted  During the past four weeks, what was the hardest physical activity you could do for at least two minutes?: Light  Can you get to places out of walking distance without help?  (For example, can you travel alone on buses or taxis, or drive your own car?): Yes  Can you go shopping for groceries or clothes without someone's help?: Yes  Can you prepare your own meals?: Yes  Can you do your own housework without help?: Yes  Because of any health problems, do you need the help of another person  with your personal care needs such as eating, bathing, dressing, or getting around the house?: No  Can you handle your own money without help?: Yes  During the past four weeks, how would you rate your health in general?: Very good  How have things been going for you during the past four weeks?: Pretty well  Are you having difficulties driving your car?: No  Do you always fasten your seat belt when you are in a car?: Yes, usually  How often in the past four weeks have you been bothered by falling or dizzy when standing up?: Never  How often in the past four weeks have you been bothered by sexual problems?: Sometimes  How often in the past four weeks have you been bothered by trouble eating well?: Never  How often in the past four weeks have you been bothered by teeth or denture problems?: Sometimes  How often in the past four weeks have you been bothered with problems using the telephone?: Never  How often in the past four weeks have you been bothered by tiredness or fatigue?: Never  Have you fallen two or more times in the past year?: No  Are you afraid of falling?: No  Are you a smoker?: No  During the past four weeks, how many drinks of wine, beer, or other alcoholic beverages did you have?: No alcohol at all  Do you exercise for about 20 minutes three or more days a week?: Yes, some of the time  Have you been given any information to help you with hazards in your house that might hurt you?: Yes  Have you been given any information to help you with keeping track of your medications?: Yes  How often do you have trouble taking medicines the way you've been told to take them?: I always take them as prescribed  How confident are you that you can control and manage most of your health problems?: Very confident  What is your race? (Check all that apply.):     · Health Maintenance   Last eye exam: 1.5 years ago   Last CV screen with lipids: 02/07/2022   Diabetes screening with fasting glucose or A1c:  02/07/2022   Colonoscopy: 06/19/2019- Repeat in 5 years   Flu Vaccine: Out of season   Pneumonia vaccines: 08/05/2019; 05/09/2022   COVID vaccine: Had x 4   Hep B vaccine: Not at risk   DEXA: N/A   Last pap/pelvic: N/A   Last Mammogram: N/A   Last PSA screen: 12/10/2021   AAA screening: N/A (once in lifetime for males 65-75 who have smoked > 100 cigarettes in lifetime)  HIV Screeing: N/A  Hepatitis C Screen: 04/26/2021- Nonreactive  Low Dose CT Scan: N/a    Health Maintenance Topics with due status: Not Due       Topic Last Completion Date    Colorectal Cancer Screening 06/19/2019    Lipid Panel 02/07/2022    Pneumococcal Vaccines (Age 65+) 05/09/2022    Influenza Vaccine Not Due     There are no preventive care reminders to display for this patient.     Incontinence  Bowel: No  Bladder: No    Lab results available in Epic or see dates from [facility] above:   Lab Results   Component Value Date    CHOL 135 02/07/2022    CHOL 126 09/01/2021     Lab Results   Component Value Date    HDL 40 02/07/2022    HDL 39 (L) 09/01/2021     Lab Results   Component Value Date    LDLCALC 68 02/07/2022    LDLCALC 54 09/01/2021     Lab Results   Component Value Date    TRIG 137 02/07/2022    TRIG 165 (H) 09/01/2021     Lab Results   Component Value Date    CHOLHDL 3.4 02/07/2022    CHOLHDL 3.2 09/01/2021       Lab Results   Component Value Date    HGBA1C 5.9 02/07/2022       Sodium   Date Value Ref Range Status   02/07/2022 139 136 - 145 mmol/L Final     Potassium   Date Value Ref Range Status   02/07/2022 3.7 3.5 - 5.1 mmol/L Final     Chloride   Date Value Ref Range Status   02/07/2022 105 98 - 107 mmol/L Final     CO2   Date Value Ref Range Status   02/07/2022 30 21 - 32 mmol/L Final     Glucose   Date Value Ref Range Status   02/07/2022 97 74 - 106 mg/dL Final     BUN   Date Value Ref Range Status   02/07/2022 15 7 - 18 mg/dL Final     Creatinine   Date Value Ref Range Status   02/07/2022 0.96 0.70 - 1.30 mg/dL Final     Calcium   Date  "Value Ref Range Status   02/07/2022 9.3 8.5 - 10.1 mg/dL Final     Total Protein   Date Value Ref Range Status   02/07/2022 8.2 6.4 - 8.2 g/dL Final     Albumin   Date Value Ref Range Status   02/07/2022 4.2 3.5 - 5.0 g/dL Final     Bilirubin, Total   Date Value Ref Range Status   02/07/2022 0.6 0.0 - 1.2 mg/dL Final     Alk Phos   Date Value Ref Range Status   02/07/2022 94 45 - 115 U/L Final     AST   Date Value Ref Range Status   02/07/2022 20 15 - 37 U/L Final     ALT   Date Value Ref Range Status   02/07/2022 33 16 - 61 U/L Final     Anion Gap   Date Value Ref Range Status   02/07/2022 8 7 - 16 mmol/L Final     eGFR    Date Value Ref Range Status   09/01/2021 108 >=60 mL/min/1.73m² Final     eGFR   Date Value Ref Range Status   02/07/2022 83 >=60 mL/min/1.73m² Final         Lab Results   Component Value Date    PSA <0.010 12/10/2021    PSA <0.010 05/10/2021       · Care Team   PCP: TANK Arias    Urology: Monica    **See Completed Assessments for Annual Wellness visit within the encounter summary    The following assessments were completed & reviewed:  · Depression Screening  · Cognitive function Screening  · Timed Get Up Test  · Whisper Test  · Vision Screen  · Health Risk Assessment  · Checklist of ADLs and IADLs      Objective  Vitals:    05/09/22 1315   BP: 138/70   Pulse: 75   Resp: 20   Temp: 98 °F (36.7 °C)   TempSrc: Oral   SpO2: 98%   Weight: 98.4 kg (217 lb)   Height: 5' 9" (1.753 m)   PainSc: 0-No pain      Body mass index is 32.05 kg/m².  Ideal body weight: 70.7 kg (155 lb 13.8 oz)       Physical Exam  Vitals and nursing note reviewed.   Constitutional:       Appearance: Normal appearance.   HENT:      Head: Normocephalic and atraumatic.      Right Ear: External ear normal.      Left Ear: External ear normal.      Nose: Nose normal.      Mouth/Throat:      Mouth: Mucous membranes are dry.      Pharynx: Oropharynx is clear.   Eyes:      Pupils: Pupils are equal, round, and " reactive to light.   Cardiovascular:      Rate and Rhythm: Normal rate and regular rhythm.      Pulses: Normal pulses.      Heart sounds: Normal heart sounds. No murmur heard.    No gallop.   Pulmonary:      Effort: Pulmonary effort is normal.      Breath sounds: Normal breath sounds. No wheezing or rales.   Abdominal:      General: Abdomen is flat. Bowel sounds are normal. There is no distension.      Palpations: Abdomen is soft.      Tenderness: There is no abdominal tenderness.   Musculoskeletal:         General: Normal range of motion.      Cervical back: Normal range of motion and neck supple.   Skin:     General: Skin is warm and dry.      Capillary Refill: Capillary refill takes less than 2 seconds.   Neurological:      General: No focal deficit present.      Mental Status: He is alert and oriented to person, place, and time.   Psychiatric:         Mood and Affect: Mood normal.         Behavior: Behavior normal.           Assessment:     1. Essential hypertension    2. Hyperlipidemia, unspecified hyperlipidemia type    3. Erectile dysfunction after radical prostatectomy    4. BMI 32.0-32.9,adult    5. Encounter for preventive health examination    6. Overweight         Plan:    Referrals/Orders:  Boostrix, Pneumovax     Advised to call office if does not hear from anyone with referral appt within 2-3 weeks to check on status of referral. Voiced understanding.    Keep current on appts & continue medications as ordered. Encouraged diet & exercise to decrease weight/BMI.  Go to pharmacy to get TDAP vaccine as ordered.      Discussed and provided with a screening schedule and personal prevention plan in accordance with USPSTF age appropriate recommendations and Medicare screening guidelines.   Education, counseling, and referrals were provided as needed.  After Visit Summary printed and given to patient which includes written education and a list of any referrals if indicated. All education discussed with patient  & handouts given to patient.      F/u plan for yearly AWV.    Signature: PHILLIP ArnoldC

## 2022-05-09 NOTE — PATIENT INSTRUCTIONS
Counseling and Referral of Other Preventative  (Italic type indicates deductible and co-insurance are waived)    Patient Name: Kory Douglas  Today's Date: 5/9/2022    Health Maintenance       Date Due Completion Date    Pneumococcal Vaccines (Age 65+) (3 - PCV) 08/27/2020 8/27/2019    TETANUS VACCINE 11/09/2022 (Originally 6/12/1973) ---    Shingles Vaccine (1 of 2) 05/09/2023 (Originally 6/12/1974) ---    Influenza Vaccine (Season Ended) 09/01/2022 ---    Colorectal Cancer Screening 06/19/2024 6/19/2019    Lipid Panel 02/07/2027 2/7/2022        No orders of the defined types were placed in this encounter.    The following information is provided to all patients.  This information is to help you find resources for any of the problems found today that may be affecting your health:                Living healthy guide: www.[facility].[place].[facility]      Understanding Diabetes: www.diabetes.org      Eating healthy: www.cdc.gov/healthyweight      [facility] home safety checklist: www.cdc.gov/steadi/patient.html      Agency on Aging: www.goea.[place].[facility]      Alcoholics anonymous (AA): www.aa.org      Physical Activity: www.aron.nih.gov/bo5nysu      Tobacco use: www.quitwithusla.org

## 2022-05-13 PROBLEM — E66.3 OVERWEIGHT: Status: ACTIVE | Noted: 2022-05-13

## 2022-05-13 PROBLEM — Z00.00 ENCOUNTER FOR PREVENTIVE HEALTH EXAMINATION: Status: ACTIVE | Noted: 2022-05-13

## 2022-06-17 ENCOUNTER — TELEPHONE (OUTPATIENT)
Dept: UROLOGY | Facility: CLINIC | Age: 67
End: 2022-06-17
Payer: COMMERCIAL

## 2022-06-17 ENCOUNTER — HOSPITAL ENCOUNTER (OUTPATIENT)
Dept: RADIOLOGY | Facility: HOSPITAL | Age: 67
Discharge: HOME | End: 2022-06-17
Attending: NURSE PRACTITIONER
Payer: COMMERCIAL

## 2022-06-17 DIAGNOSIS — C61 CANCER OF PROSTATE: ICD-10-CM

## 2022-06-17 PROCEDURE — A9503 TC99M MEDRONATE: HCPCS

## 2022-06-17 PROCEDURE — 78306 NM BONE SCAN WHOLE BODY: ICD-10-PCS | Mod: 26,,, | Performed by: RADIOLOGY

## 2022-06-17 PROCEDURE — 78306 BONE IMAGING WHOLE BODY: CPT | Mod: 26,,, | Performed by: RADIOLOGY

## 2022-06-17 PROCEDURE — 78306 BONE IMAGING WHOLE BODY: CPT | Mod: TC

## 2022-06-17 NOTE — TELEPHONE ENCOUNTER
----- Message from ÁNGEL Alba sent at 6/17/2022  2:35 PM CDT -----  Increased update of the lower thoracic and lumbar spine than seen on previous study seen.  F/u with PCP for plain films of thoracic and lumbar spine to exclude sclerotic  mass.  F/u with PCP for this imaging and work up.  I called and spoke with pt and relayed the above information to him.  He voiced understanding and says he will follow up with his PCP.

## 2022-06-17 NOTE — PROGRESS NOTES
Increased update of the lower thoracic and lumbar spine than seen on previous study seen.  F/u with PCP for plain films of thoracic and lumbar spine to exclude sclerotic  mass.  F/u with PCP for this imaging and work up.

## 2022-06-21 ENCOUNTER — OFFICE VISIT (OUTPATIENT)
Dept: UROLOGY | Facility: CLINIC | Age: 67
End: 2022-06-21
Payer: COMMERCIAL

## 2022-06-21 VITALS
BODY MASS INDEX: 32.14 KG/M2 | OXYGEN SATURATION: 99 % | WEIGHT: 217 LBS | DIASTOLIC BLOOD PRESSURE: 67 MMHG | SYSTOLIC BLOOD PRESSURE: 135 MMHG | TEMPERATURE: 98 F | HEIGHT: 69 IN | HEART RATE: 71 BPM

## 2022-06-21 DIAGNOSIS — C61 CANCER OF PROSTATE: Primary | ICD-10-CM

## 2022-06-21 DIAGNOSIS — N52.31 ERECTILE DYSFUNCTION AFTER RADICAL PROSTATECTOMY: Chronic | ICD-10-CM

## 2022-06-21 PROCEDURE — 3075F SYST BP GE 130 - 139MM HG: CPT | Mod: CPTII,,, | Performed by: NURSE PRACTITIONER

## 2022-06-21 PROCEDURE — 3075F PR MOST RECENT SYSTOLIC BLOOD PRESS GE 130-139MM HG: ICD-10-PCS | Mod: CPTII,,, | Performed by: NURSE PRACTITIONER

## 2022-06-21 PROCEDURE — 1101F PR PT FALLS ASSESS DOC 0-1 FALLS W/OUT INJ PAST YR: ICD-10-PCS | Mod: CPTII,,, | Performed by: NURSE PRACTITIONER

## 2022-06-21 PROCEDURE — 1160F RVW MEDS BY RX/DR IN RCRD: CPT | Mod: CPTII,,, | Performed by: NURSE PRACTITIONER

## 2022-06-21 PROCEDURE — 1101F PT FALLS ASSESS-DOCD LE1/YR: CPT | Mod: CPTII,,, | Performed by: NURSE PRACTITIONER

## 2022-06-21 PROCEDURE — 3008F PR BODY MASS INDEX (BMI) DOCUMENTED: ICD-10-PCS | Mod: CPTII,,, | Performed by: NURSE PRACTITIONER

## 2022-06-21 PROCEDURE — 99214 OFFICE O/P EST MOD 30 MIN: CPT | Mod: PBBFAC | Performed by: NURSE PRACTITIONER

## 2022-06-21 PROCEDURE — 3078F DIAST BP <80 MM HG: CPT | Mod: CPTII,,, | Performed by: NURSE PRACTITIONER

## 2022-06-21 PROCEDURE — 1160F PR REVIEW ALL MEDS BY PRESCRIBER/CLIN PHARMACIST DOCUMENTED: ICD-10-PCS | Mod: CPTII,,, | Performed by: NURSE PRACTITIONER

## 2022-06-21 PROCEDURE — 3078F PR MOST RECENT DIASTOLIC BLOOD PRESSURE < 80 MM HG: ICD-10-PCS | Mod: CPTII,,, | Performed by: NURSE PRACTITIONER

## 2022-06-21 PROCEDURE — 3066F NEPHROPATHY DOC TX: CPT | Mod: CPTII,,, | Performed by: NURSE PRACTITIONER

## 2022-06-21 PROCEDURE — 99214 PR OFFICE/OUTPT VISIT, EST, LEVL IV, 30-39 MIN: ICD-10-PCS | Mod: S$PBB,,, | Performed by: NURSE PRACTITIONER

## 2022-06-21 PROCEDURE — 1159F MED LIST DOCD IN RCRD: CPT | Mod: CPTII,,, | Performed by: NURSE PRACTITIONER

## 2022-06-21 PROCEDURE — 3288F FALL RISK ASSESSMENT DOCD: CPT | Mod: CPTII,,, | Performed by: NURSE PRACTITIONER

## 2022-06-21 PROCEDURE — 99214 OFFICE O/P EST MOD 30 MIN: CPT | Mod: S$PBB,,, | Performed by: NURSE PRACTITIONER

## 2022-06-21 PROCEDURE — 3288F PR FALLS RISK ASSESSMENT DOCUMENTED: ICD-10-PCS | Mod: CPTII,,, | Performed by: NURSE PRACTITIONER

## 2022-06-21 PROCEDURE — 3044F HG A1C LEVEL LT 7.0%: CPT | Mod: CPTII,,, | Performed by: NURSE PRACTITIONER

## 2022-06-21 PROCEDURE — 1159F PR MEDICATION LIST DOCUMENTED IN MEDICAL RECORD: ICD-10-PCS | Mod: CPTII,,, | Performed by: NURSE PRACTITIONER

## 2022-06-21 PROCEDURE — 3044F PR MOST RECENT HEMOGLOBIN A1C LEVEL <7.0%: ICD-10-PCS | Mod: CPTII,,, | Performed by: NURSE PRACTITIONER

## 2022-06-21 PROCEDURE — 3008F BODY MASS INDEX DOCD: CPT | Mod: CPTII,,, | Performed by: NURSE PRACTITIONER

## 2022-06-21 PROCEDURE — 3066F PR DOCUMENTATION OF TREATMENT FOR NEPHROPATHY: ICD-10-PCS | Mod: CPTII,,, | Performed by: NURSE PRACTITIONER

## 2022-06-21 PROCEDURE — 3061F NEG MICROALBUMINURIA REV: CPT | Mod: CPTII,,, | Performed by: NURSE PRACTITIONER

## 2022-06-21 PROCEDURE — 3061F PR NEG MICROALBUMINURIA RESULT DOCUMENTED/REVIEW: ICD-10-PCS | Mod: CPTII,,, | Performed by: NURSE PRACTITIONER

## 2022-06-21 NOTE — PROGRESS NOTES
Subjective:       Patient ID: Kory Douglas is a 67 y.o. male.    Chief Complaint: Follow-up (6 month f/u with PSA/bone scan results)    UROLOGICAL HISTORY-  MARITZA  psa today. Undetectable.  Doing well w/o complaints.    Previous notes below:    Patient presents for 3 month f/u with PSA prior. His PSA on 02/01/2021 <0.010 undetectable. His numbers have been undetectable. Had surgery January 1, 2019. He got 3 shots every 6 months (18 months of ADT). Had hot flashes with it. Recommend getting back on shots if numbers become detectable and then will refer back to rad/onc.     pathology below    Patient is post radicle 2019. His PSA is 0.010ng/ml on 1/20/2020. 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 The patient's tumor invades the bladder neck in the resection margins are positive areas.    did not get XRT, did get at least 1 ADT dose.     Plan:  3 months with PSA   If PSA is undectable next visit will go to every 6 months.   *no medication changes today.     Last note below:  Patient presents for 3 month f/u with PSA. His PSA on 10/30/2020 <0.010 undetectable. Discussed had aggressive prostate cancer at high risk for recurrence . Recommend continuing to check PSA every 3 months and if gets above 0.2 will need a scan.   Had one dose of hormone therapy-off of for over 6 months. Not taking Casodex either. Reports his hot flashes are better.     Assessment:  ED  CAP  Hx of elevated PSA    Plan:  F/u in 3 months with PSA prior. .       Last note below:  07/29/2020  xxxxxxxxxxxxxxxxxxxxxxxxxxxxxxxxxxxxxxxxxxxxxxxxxxxxxxxxxxxxxxxxxxxx    Mr. Douglas is a previous patient of Irena Seo and Conner, who is here today to establish continued surveillance of his CAP, GS 4+4, s/p surgery and 18 months of ADT.    Patient here for 6 month f/u. Former patient of Dr. Prieto. Voices no complaints.      -follow up 3 months, if  PSA > 0.20 then PET Axium scan. if + then XRT recommendations for salvage. holding off on ADT at this time.     bad hot flashes. patient was not on vitd/ calcium.     psa is undetectable today.   (12/16/2021)------------------------------------------------------------------------      Mr. Douglas has returned for 6 month f/u for surveillance of CAP, GS 8, s/p surgery 1/2019.  History of 18 mos of ADT.   Tumor invades the bladder neck in the resection margins are positive areas.Denies bleeding, wt loss, bone pain, dysuria, or difficulty emptying bladder.  PVR 18.  Recent Bone Scan performed:  Increased update of the lower thoracic and lumbar spine than seen on previous study seen.F/u with PCP for this imaging and work up.  F/u with PCP for plain films of thoracic and lumbar spine to exclude sclerotic  mass.    Lab Results       Component                Value               Date                       CREATININE               0.96                02/07/2022              Review of Systems   Constitutional: Negative.    Eyes: Negative for visual disturbance.   Respiratory: Negative.    Cardiovascular: Negative.    Gastrointestinal: Negative.    Genitourinary: Negative.    Musculoskeletal: Negative.    Skin: Negative.    Allergic/Immunologic: Negative.    Neurological: Negative.    Hematological: Negative.    Psychiatric/Behavioral: Negative.        Objective:      Physical Exam  Vitals and nursing note reviewed.   Constitutional:       General: He is not in acute distress.     Appearance: Normal appearance. He is not ill-appearing, toxic-appearing or diaphoretic.   Eyes:      General: No scleral icterus.  Cardiovascular:      Rate and Rhythm: Normal rate.   Pulmonary:      Effort: Pulmonary effort is normal.   Abdominal:      General: There is no distension.      Palpations: Abdomen is soft.      Tenderness: There is no abdominal tenderness. There is no right CVA tenderness or left CVA tenderness.   Musculoskeletal:       Right lower leg: No edema.      Left lower leg: No edema.   Skin:     General: Skin is warm and dry.      Coloration: Skin is not jaundiced or pale.      Findings: No rash.   Neurological:      Mental Status: He is alert and oriented to person, place, and time. Mental status is at baseline.   Psychiatric:         Mood and Affect: Mood normal.         Behavior: Behavior normal.         Thought Content: Thought content normal.         Judgment: Judgment normal.         Assessment:       1. Cancer of prostate    2. Erectile dysfunction after radical prostatectomy        Plan:       Cancer of prostate  · PSA remains immeasurable at <0.010  · No further treatment at this time.  · Continue surveillance q 6 months with PSA  · Recommend restarting ADT if numbers become detectable and then will refer back to rad/onc.  · Continue vitamin D and C  · Bone Scan done:  Showed Increased update of the lower thoracic and lumbar spine than seen on previous study seen.  patient declines imaging here today before leaving, needs F/u with PCP for plain films of thoracic and lumbar spine to exclude sclerotic  mass.  He elects to F/u with PCP at scheduled visit next month for this imaging and work up.    Erectile dysfunction after radical prostatectomy  · Controlled, continue sildenafil as prescribed.

## 2022-06-21 NOTE — ASSESSMENT & PLAN NOTE
· PSA remains immeasurable at <0.010  · No further treatment at this time.  · Continue surveillance q 6 months with PSA  · Recommend restarting ADT if numbers become detectable and then will refer back to rad/onc.  · Continue vitamin D and C  · Bone Scan done:  Showed Increased update of the lower thoracic and lumbar spine than seen on previous study seen.  patient declines imaging here today before leaving, needs F/u with PCP for plain films of thoracic and lumbar spine to exclude sclerotic  mass.  He elects to F/u with PCP at scheduled visit next month for this imaging and work up.

## 2022-07-21 ENCOUNTER — OFFICE VISIT (OUTPATIENT)
Dept: FAMILY MEDICINE | Facility: CLINIC | Age: 67
End: 2022-07-21
Payer: COMMERCIAL

## 2022-07-21 VITALS
DIASTOLIC BLOOD PRESSURE: 83 MMHG | BODY MASS INDEX: 31.1 KG/M2 | HEIGHT: 69 IN | TEMPERATURE: 98 F | SYSTOLIC BLOOD PRESSURE: 146 MMHG | RESPIRATION RATE: 18 BRPM | WEIGHT: 210 LBS | HEART RATE: 73 BPM

## 2022-07-21 DIAGNOSIS — R73.01 IMPAIRED FASTING GLUCOSE: ICD-10-CM

## 2022-07-21 DIAGNOSIS — E78.5 HYPERLIPIDEMIA, UNSPECIFIED HYPERLIPIDEMIA TYPE: ICD-10-CM

## 2022-07-21 DIAGNOSIS — N52.31 ERECTILE DYSFUNCTION AFTER RADICAL PROSTATECTOMY: ICD-10-CM

## 2022-07-21 DIAGNOSIS — E55.9 VITAMIN D DEFICIENCY: ICD-10-CM

## 2022-07-21 DIAGNOSIS — Z12.5 PROSTATE CANCER SCREENING: ICD-10-CM

## 2022-07-21 DIAGNOSIS — I10 ESSENTIAL HYPERTENSION: ICD-10-CM

## 2022-07-21 DIAGNOSIS — C61 CANCER OF PROSTATE: ICD-10-CM

## 2022-07-21 LAB
ALBUMIN SERPL BCP-MCNC: 4 G/DL (ref 3.5–5)
ALBUMIN/GLOB SERPL: 1.1 {RATIO}
ALP SERPL-CCNC: 76 U/L (ref 45–115)
ALT SERPL W P-5'-P-CCNC: 39 U/L (ref 16–61)
ANION GAP SERPL CALCULATED.3IONS-SCNC: 13 MMOL/L (ref 7–16)
AST SERPL W P-5'-P-CCNC: 24 U/L (ref 15–37)
BASOPHILS # BLD AUTO: 0.07 K/UL (ref 0–0.2)
BASOPHILS NFR BLD AUTO: 0.9 % (ref 0–1)
BILIRUB SERPL-MCNC: 0.7 MG/DL (ref 0–1.2)
BUN SERPL-MCNC: 11 MG/DL (ref 7–18)
BUN/CREAT SERPL: 12 (ref 6–20)
CALCIUM SERPL-MCNC: 8.7 MG/DL (ref 8.5–10.1)
CHLORIDE SERPL-SCNC: 104 MMOL/L (ref 98–107)
CHOLEST SERPL-MCNC: 105 MG/DL (ref 0–200)
CHOLEST/HDLC SERPL: 2.8 {RATIO}
CO2 SERPL-SCNC: 27 MMOL/L (ref 21–32)
CREAT SERPL-MCNC: 0.9 MG/DL (ref 0.7–1.3)
DIFFERENTIAL METHOD BLD: ABNORMAL
EOSINOPHIL # BLD AUTO: 0.16 K/UL (ref 0–0.5)
EOSINOPHIL NFR BLD AUTO: 2.1 % (ref 1–4)
ERYTHROCYTE [DISTWIDTH] IN BLOOD BY AUTOMATED COUNT: 14 % (ref 11.5–14.5)
EST. AVERAGE GLUCOSE BLD GHB EST-MCNC: 100 MG/DL
GLOBULIN SER-MCNC: 3.7 G/DL (ref 2–4)
GLUCOSE SERPL-MCNC: 80 MG/DL (ref 74–106)
HBA1C MFR BLD HPLC: 5.6 % (ref 4.5–6.6)
HCT VFR BLD AUTO: 41.5 % (ref 40–54)
HDLC SERPL-MCNC: 38 MG/DL (ref 40–60)
HGB BLD-MCNC: 13.1 G/DL (ref 13.5–18)
IMM GRANULOCYTES # BLD AUTO: 0.02 K/UL (ref 0–0.04)
IMM GRANULOCYTES NFR BLD: 0.3 % (ref 0–0.4)
LDLC SERPL CALC-MCNC: 45 MG/DL
LDLC/HDLC SERPL: 1.2 {RATIO}
LYMPHOCYTES # BLD AUTO: 3.36 K/UL (ref 1–4.8)
LYMPHOCYTES NFR BLD AUTO: 44.9 % (ref 27–41)
MCH RBC QN AUTO: 28.6 PG (ref 27–31)
MCHC RBC AUTO-ENTMCNC: 31.6 G/DL (ref 32–36)
MCV RBC AUTO: 90.6 FL (ref 80–96)
MONOCYTES # BLD AUTO: 0.45 K/UL (ref 0–0.8)
MONOCYTES NFR BLD AUTO: 6 % (ref 2–6)
MPC BLD CALC-MCNC: 11 FL (ref 9.4–12.4)
NEUTROPHILS # BLD AUTO: 3.42 K/UL (ref 1.8–7.7)
NEUTROPHILS NFR BLD AUTO: 45.8 % (ref 53–65)
NONHDLC SERPL-MCNC: 67 MG/DL
NRBC # BLD AUTO: 0 X10E3/UL
NRBC, AUTO (.00): 0 %
PLATELET # BLD AUTO: 273 K/UL (ref 150–400)
POTASSIUM SERPL-SCNC: 3.9 MMOL/L (ref 3.5–5.1)
PROT SERPL-MCNC: 7.7 G/DL (ref 6.4–8.2)
PSA SERPL-MCNC: <0.01 NG/ML (ref 0–4.1)
RBC # BLD AUTO: 4.58 M/UL (ref 4.6–6.2)
SODIUM SERPL-SCNC: 140 MMOL/L (ref 136–145)
TRIGL SERPL-MCNC: 110 MG/DL (ref 35–150)
VLDLC SERPL-MCNC: 22 MG/DL
WBC # BLD AUTO: 7.48 K/UL (ref 4.5–11)

## 2022-07-21 PROCEDURE — 1159F PR MEDICATION LIST DOCUMENTED IN MEDICAL RECORD: ICD-10-PCS | Mod: ,,, | Performed by: NURSE PRACTITIONER

## 2022-07-21 PROCEDURE — G0103 PSA, SCREENING: ICD-10-PCS | Mod: ,,, | Performed by: CLINICAL MEDICAL LABORATORY

## 2022-07-21 PROCEDURE — 83036 HEMOGLOBIN A1C: ICD-10-PCS | Mod: ,,, | Performed by: CLINICAL MEDICAL LABORATORY

## 2022-07-21 PROCEDURE — 80053 COMPREHEN METABOLIC PANEL: CPT | Mod: ,,, | Performed by: CLINICAL MEDICAL LABORATORY

## 2022-07-21 PROCEDURE — 85025 COMPLETE CBC W/AUTO DIFF WBC: CPT | Mod: ,,, | Performed by: CLINICAL MEDICAL LABORATORY

## 2022-07-21 PROCEDURE — 3077F PR MOST RECENT SYSTOLIC BLOOD PRESSURE >= 140 MM HG: ICD-10-PCS | Mod: ,,, | Performed by: NURSE PRACTITIONER

## 2022-07-21 PROCEDURE — 3077F SYST BP >= 140 MM HG: CPT | Mod: ,,, | Performed by: NURSE PRACTITIONER

## 2022-07-21 PROCEDURE — 1101F PT FALLS ASSESS-DOCD LE1/YR: CPT | Mod: ,,, | Performed by: NURSE PRACTITIONER

## 2022-07-21 PROCEDURE — 3061F NEG MICROALBUMINURIA REV: CPT | Mod: ,,, | Performed by: NURSE PRACTITIONER

## 2022-07-21 PROCEDURE — 80053 COMPREHENSIVE METABOLIC PANEL: ICD-10-PCS | Mod: ,,, | Performed by: CLINICAL MEDICAL LABORATORY

## 2022-07-21 PROCEDURE — 1160F RVW MEDS BY RX/DR IN RCRD: CPT | Mod: ,,, | Performed by: NURSE PRACTITIONER

## 2022-07-21 PROCEDURE — 3079F DIAST BP 80-89 MM HG: CPT | Mod: ,,, | Performed by: NURSE PRACTITIONER

## 2022-07-21 PROCEDURE — 1101F PR PT FALLS ASSESS DOC 0-1 FALLS W/OUT INJ PAST YR: ICD-10-PCS | Mod: ,,, | Performed by: NURSE PRACTITIONER

## 2022-07-21 PROCEDURE — 3061F PR NEG MICROALBUMINURIA RESULT DOCUMENTED/REVIEW: ICD-10-PCS | Mod: ,,, | Performed by: NURSE PRACTITIONER

## 2022-07-21 PROCEDURE — 3288F PR FALLS RISK ASSESSMENT DOCUMENTED: ICD-10-PCS | Mod: ,,, | Performed by: NURSE PRACTITIONER

## 2022-07-21 PROCEDURE — 3044F HG A1C LEVEL LT 7.0%: CPT | Mod: ,,, | Performed by: NURSE PRACTITIONER

## 2022-07-21 PROCEDURE — 83036 HEMOGLOBIN GLYCOSYLATED A1C: CPT | Mod: ,,, | Performed by: CLINICAL MEDICAL LABORATORY

## 2022-07-21 PROCEDURE — 3008F PR BODY MASS INDEX (BMI) DOCUMENTED: ICD-10-PCS | Mod: ,,, | Performed by: NURSE PRACTITIONER

## 2022-07-21 PROCEDURE — 3044F PR MOST RECENT HEMOGLOBIN A1C LEVEL <7.0%: ICD-10-PCS | Mod: ,,, | Performed by: NURSE PRACTITIONER

## 2022-07-21 PROCEDURE — 3066F NEPHROPATHY DOC TX: CPT | Mod: ,,, | Performed by: NURSE PRACTITIONER

## 2022-07-21 PROCEDURE — 3079F PR MOST RECENT DIASTOLIC BLOOD PRESSURE 80-89 MM HG: ICD-10-PCS | Mod: ,,, | Performed by: NURSE PRACTITIONER

## 2022-07-21 PROCEDURE — 1160F PR REVIEW ALL MEDS BY PRESCRIBER/CLIN PHARMACIST DOCUMENTED: ICD-10-PCS | Mod: ,,, | Performed by: NURSE PRACTITIONER

## 2022-07-21 PROCEDURE — 99214 OFFICE O/P EST MOD 30 MIN: CPT | Mod: ,,, | Performed by: NURSE PRACTITIONER

## 2022-07-21 PROCEDURE — 1159F MED LIST DOCD IN RCRD: CPT | Mod: ,,, | Performed by: NURSE PRACTITIONER

## 2022-07-21 PROCEDURE — 3008F BODY MASS INDEX DOCD: CPT | Mod: ,,, | Performed by: NURSE PRACTITIONER

## 2022-07-21 PROCEDURE — 99214 PR OFFICE/OUTPT VISIT, EST, LEVL IV, 30-39 MIN: ICD-10-PCS | Mod: ,,, | Performed by: NURSE PRACTITIONER

## 2022-07-21 PROCEDURE — 3066F PR DOCUMENTATION OF TREATMENT FOR NEPHROPATHY: ICD-10-PCS | Mod: ,,, | Performed by: NURSE PRACTITIONER

## 2022-07-21 PROCEDURE — 3288F FALL RISK ASSESSMENT DOCD: CPT | Mod: ,,, | Performed by: NURSE PRACTITIONER

## 2022-07-21 PROCEDURE — G0103 PSA SCREENING: HCPCS | Mod: ,,, | Performed by: CLINICAL MEDICAL LABORATORY

## 2022-07-21 PROCEDURE — 80061 LIPID PANEL: CPT | Mod: ,,, | Performed by: CLINICAL MEDICAL LABORATORY

## 2022-07-21 PROCEDURE — 80061 LIPID PANEL: ICD-10-PCS | Mod: ,,, | Performed by: CLINICAL MEDICAL LABORATORY

## 2022-07-21 PROCEDURE — 85025 CBC WITH DIFFERENTIAL: ICD-10-PCS | Mod: ,,, | Performed by: CLINICAL MEDICAL LABORATORY

## 2022-07-21 RX ORDER — CALCIUM CARBONATE 500(1250)
1 TABLET ORAL DAILY
Qty: 90 TABLET | Refills: 3 | Status: SHIPPED | OUTPATIENT
Start: 2022-07-21 | End: 2024-01-05 | Stop reason: SDUPTHER

## 2022-07-21 RX ORDER — ROSUVASTATIN CALCIUM 10 MG/1
10 TABLET, COATED ORAL NIGHTLY
Qty: 90 TABLET | Refills: 3 | Status: SHIPPED | OUTPATIENT
Start: 2022-07-21 | End: 2023-01-05 | Stop reason: SDUPTHER

## 2022-07-21 RX ORDER — HYDROCHLOROTHIAZIDE 25 MG/1
25 TABLET ORAL DAILY
Qty: 90 TABLET | Refills: 3 | Status: SHIPPED | OUTPATIENT
Start: 2022-07-21 | End: 2023-01-05 | Stop reason: SDUPTHER

## 2022-07-21 RX ORDER — SILDENAFIL 25 MG/1
TABLET, FILM COATED ORAL
Qty: 40 TABLET | Refills: 5 | Status: SHIPPED | OUTPATIENT
Start: 2022-07-21

## 2022-07-21 RX ORDER — AMLODIPINE BESYLATE 10 MG/1
10 TABLET ORAL DAILY
Qty: 90 TABLET | Refills: 3 | Status: SHIPPED | OUTPATIENT
Start: 2022-07-21 | End: 2023-01-05 | Stop reason: SDUPTHER

## 2022-07-21 NOTE — ASSESSMENT & PLAN NOTE
Lab Results   Component Value Date    CHOL 135 02/07/2022    CHOL 126 09/01/2021    TRIG 137 02/07/2022    TRIG 165 (H) 09/01/2021    HDL 40 02/07/2022    HDL 39 (L) 09/01/2021    LDLCALC 68 02/07/2022    LDLCALC 54 09/01/2021    CHOLHDL 3.4 02/07/2022    CHOLHDL 3.2 09/01/2021    NONHDLCHOL 95 02/07/2022    NONHDLCHOL 87 09/01/2021

## 2022-07-21 NOTE — PROGRESS NOTES
ÁNGEL Benitez   [address] [place] [address]     PATIENT NAME: Kory Douglas  : 1955  DATE: 22  MRN: [id number]      Billing Provider: ÁNGEL Benitez  Level of Service: [place] OFFICE/OUTPT VISIT, EST, LEVL IV, 30-39 MIN  Patient PCP Information     Provider PCP Type    ÁNGEL Benitez General          Reason for Visit / Chief Complaint: Follow-up (6 month follow up)       Update PCP  Update Chief Complaint         History of Present Illness / Problem Focused Workflow     Kory Douglas presents to the clinic with Follow-up (6 month follow up)     HPI    Review of Systems     Review of Systems   Constitutional: Negative for chills, diaphoresis, fatigue and fever.   HENT: Negative for nasal congestion, dental problem, ear pain and postnasal drip.    Eyes: Negative for visual disturbance.   Respiratory: Negative for shortness of breath and wheezing.    Cardiovascular: Negative for chest pain and palpitations.   Gastrointestinal: Negative for abdominal distention and abdominal pain.   Endocrine: Negative for cold intolerance and heat intolerance.   Genitourinary: Negative for enuresis.   Musculoskeletal: Negative for neck stiffness.   Integumentary:  Negative for pallor and rash.   Neurological: Negative for dizziness, seizures, speech difficulty and weakness.   Psychiatric/Behavioral: Negative for agitation.        Medical / Social / Family History     Past Medical History:   Diagnosis Date    Cancer of prostate 2021     radical prostatectomy for Weems's 4+4 disease and tertiary pattern of Weems's 5 disease. The patient's tumor invades the bladder neck in the resection margins are positive areas. did not get XRT.    Erectile dysfunction after radical prostatectomy 2021    No meds tried to date Reviewed all methods of ED treatment    Hyperlipidemia     Hypertension        Past Surgical History:   Procedure Laterality Date    ADENOIDECTOMY      PROSTATE SURGERY    "      Social History    reports that he has never smoked. He has never used smokeless tobacco. He reports that he does not drink alcohol and does not use drugs.    Family History  MrBarbara's family history includes No Known Problems in his father and mother.    PHQ9 5/9/2022   Total Score 0           Medications and Allergies     Medications  No outpatient medications have been marked as taking for the 7/21/22 encounter (Office Visit) with ÁNGEL Benitez.       Allergies  Review of patient's allergies indicates:  No Known Allergies    Physical Examination   BP (!) 146/83 (BP Location: Left arm, Patient Position: Sitting, BP Method: Medium (Automatic))   Pulse 73   Temp 97.8 °F (36.6 °C) (Oral)   Resp 18   Ht 5' 9" (1.753 m)   Wt 95.3 kg (210 lb)   BMI 31.01 kg/m²   Physical Exam  Vitals and nursing note reviewed.   Constitutional:       Appearance: Normal appearance.   HENT:      Head: Normocephalic and atraumatic.      Right Ear: External ear normal.      Left Ear: External ear normal.      Nose: Nose normal.      Mouth/Throat:      Mouth: Mucous membranes are moist.      Pharynx: Oropharynx is clear.   Eyes:      Pupils: Pupils are equal, round, and reactive to light.   Cardiovascular:      Rate and Rhythm: Normal rate and regular rhythm.      Pulses: Normal pulses.      Heart sounds: Normal heart sounds. No murmur heard.    No gallop.   Pulmonary:      Effort: Pulmonary effort is normal.      Breath sounds: Normal breath sounds. No wheezing or rales.   Abdominal:      General: Abdomen is flat. Bowel sounds are normal. There is no distension.      Palpations: Abdomen is soft.      Tenderness: There is no abdominal tenderness.   Musculoskeletal:         General: Normal range of motion.      Cervical back: Normal range of motion and neck supple.   Skin:     General: Skin is warm and dry.      Capillary Refill: Capillary refill takes less than 2 seconds.   Neurological:      General: No focal deficit present. "      Mental Status: He is alert and oriented to person, place, and time.   Psychiatric:         Mood and Affect: Mood normal.         Behavior: Behavior normal.          Assessment and Plan (including Health Maintenance)      Problem List  Smart Sets  Document Outside HM   :    Plan:         There are no preventive care reminders to display for this patient.    Problem List Items Addressed This Visit        Cardiac/Vascular    Essential hypertension    Current Assessment & Plan     Blood Pressure Trends are:  BP Readings from Last 6 Encounters:   07/21/22 (!) 146/83   06/21/22 135/67   05/09/22 138/70   02/07/22 136/75   12/16/21 (!) 140/82   09/01/21 133/78       Most Recent Chemistry:  CMP  Lab Results   Component Value Date     02/07/2022    K 3.7 02/07/2022     02/07/2022    CO2 30 02/07/2022    GLU 97 02/07/2022    BUN 15 02/07/2022    CREATININE 0.96 02/07/2022    CALCIUM 9.3 02/07/2022    PROT 8.2 02/07/2022    ALBUMIN 4.2 02/07/2022    BILITOT 0.6 02/07/2022    ALKPHOS 94 02/07/2022    AST 20 02/07/2022    ALT 33 02/07/2022    ANIONGAP 8 02/07/2022    ESTGFRAFRICA 108 09/01/2021    EGFRNONAA 83 02/07/2022                 Relevant Medications    amLODIPine (NORVASC) 10 MG tablet    hydroCHLOROthiazide (HYDRODIURIL) 25 MG tablet    Other Relevant Orders    Comprehensive Metabolic Panel    Microalbumin/Creatinine Ratio, Urine    CBC Auto Differential    Hyperlipidemia    Current Assessment & Plan       Lab Results   Component Value Date    CHOL 135 02/07/2022    CHOL 126 09/01/2021    TRIG 137 02/07/2022    TRIG 165 (H) 09/01/2021    HDL 40 02/07/2022    HDL 39 (L) 09/01/2021    LDLCALC 68 02/07/2022    LDLCALC 54 09/01/2021    CHOLHDL 3.4 02/07/2022    CHOLHDL 3.2 09/01/2021    NONHDLCHOL 95 02/07/2022    NONHDLCHOL 87 09/01/2021              Relevant Medications    rosuvastatin (CRESTOR) 10 MG tablet    Other Relevant Orders    Lipid Panel       Renal/    Erectile dysfunction after radical  prostatectomy (Chronic)    Overview     · No meds tried to date  · Reviewed all methods of ED treatment  · Not currently taking nitrates.           Relevant Medications    sildenafiL (VIAGRA) 25 MG tablet    Prostate cancer screening    Relevant Orders    PSA, Screening       Oncology    Cancer of prostate    Overview     ·  radical prostatectomy for [place]'s 4+4 disease and tertiary pattern of Clari's 5 disease. The patient's tumor invades the bladder neck in the resection margins are positive areas.  · did not get XRT.           Relevant Medications    calcium carbonate (OS-SOL) 500 mg calcium (1,250 mg) tablet       Endocrine    Vitamin D deficiency    Impaired fasting glucose    Relevant Orders    Hemoglobin A1C          Health Maintenance Topics with due status: Not Due       Topic Last Completion Date    Colorectal Cancer Screening 06/19/2019    Lipid Panel 02/07/2022    Pneumococcal Vaccines (Age 65+) 05/09/2022    Influenza Vaccine Not Due       Future Appointments   Date Time Provider Department Center   12/21/2022  8:00 AM ÁNGEL [facility] UROL [facility]   5/10/2023 11:00 AM AWV NURSE, [facility] FAMILY MEDIC[facility] S[facility] [place]        Follow up in about 3 months (around 10/21/2022), or if symptoms worsen or fail to improve.        Signature:  ÁNGEL Benitez      [address] [place] [address]    Date of encounter: 7/21/22

## 2022-07-21 NOTE — ASSESSMENT & PLAN NOTE
Blood Pressure Trends are:  BP Readings from Last 6 Encounters:   07/21/22 (!) 146/83   06/21/22 135/67   05/09/22 138/70   02/07/22 136/75   12/16/21 (!) 140/82   09/01/21 133/78       Most Recent Chemistry:  CMP  Lab Results   Component Value Date     02/07/2022    K 3.7 02/07/2022     02/07/2022    CO2 30 02/07/2022    GLU 97 02/07/2022    BUN 15 02/07/2022    CREATININE 0.96 02/07/2022    CALCIUM 9.3 02/07/2022    PROT 8.2 02/07/2022    ALBUMIN 4.2 02/07/2022    BILITOT 0.6 02/07/2022    ALKPHOS 94 02/07/2022    AST 20 02/07/2022    ALT 33 02/07/2022    ANIONGAP 8 02/07/2022    ESTGFRAFRICA 108 09/01/2021    EGFRNONAA 83 02/07/2022

## 2022-08-11 ENCOUNTER — TELEPHONE (OUTPATIENT)
Dept: FAMILY MEDICINE | Facility: CLINIC | Age: 67
End: 2022-08-11
Payer: COMMERCIAL

## 2022-08-11 DIAGNOSIS — R94.8 ABNORMAL BONE SCAN OF THORACIC SPINE: ICD-10-CM

## 2022-08-11 DIAGNOSIS — R94.8 ABNORMAL BONE SCAN OF LUMBAR SPINE: Primary | ICD-10-CM

## 2022-08-11 DIAGNOSIS — M54.9 DORSALGIA, UNSPECIFIED: ICD-10-CM

## 2022-08-11 NOTE — TELEPHONE ENCOUNTER
----- Message from ÁNGEL Benitez sent at 8/11/2022  8:18 AM CDT -----  Regarding: RE: abnormal Bone Scan    Would he allow us to set up and MRI to eval his back?  ----- Message -----  From: ÁNGEL Alba  Sent: 8/11/2022   7:09 AM CDT  To: ÁNGEL Benitez  Subject: RE: abnormal Bone Scan                           Absolutely, whatever you feel necessary for f/u on this.  Thank you for staying on this and addressing it with him.  ----- Message -----  From: ÁNGEL Benitez  Sent: 8/10/2022   2:34 PM CDT  To: ÁNGEL Alba  Subject: RE: abnormal Bone Scan                           See message below, would he allow us to order MRI of his back to evaluate?  ----- Message -----  From: ÁNGEL Alba  Sent: 6/21/2022   9:11 AM CDT  To: ÁNGEL Benitez  Subject: abnormal Bone Scan                               Bone Scan done:  Showed Increased update of the lower thoracic and lumbar spine than seen on previous study seen.  patient declines imaging here today before leaving, needs F/u with PCP for plain films of thoracic and lumbar spine to exclude

## 2022-08-11 NOTE — TELEPHONE ENCOUNTER
----- Message from ÁNGEL Benitez sent at 8/11/2022 12:38 PM CDT -----  Regarding: RE: abnormal Bone Scan  Will you call and ask him that?  ----- Message -----  From: Aide Alas RN  Sent: 8/11/2022  12:34 PM CDT  To: ÁGNEL Benitez  Subject: FW: abnormal Bone Scan                             ----- Message -----  From: ÁNGEL Benitez  Sent: 8/11/2022   8:18 AM CDT  To: Aide Alas RN  Subject: RE: abnormal Bone Scan                             Would he allow us to set up and MRI to eval his back?  ----- Message -----  From: ÁNGEL Alba  Sent: 8/11/2022   7:09 AM CDT  To: ÁNGEL Benitez  Subject: RE: abnormal Bone Scan                           Absolutely, whatever you feel necessary for f/u on this.  Thank you for staying on this and addressing it with him.  ----- Message -----  From: ÁNGEL Benitez  Sent: 8/10/2022   2:34 PM CDT  To: ÁNGEL Alba  Subject: RE: abnormal Bone Scan                           See message below, would he allow us to order MRI of his back to evaluate?  ----- Message -----  From: ÁNGEL Alba  Sent: 6/21/2022   9:11 AM CDT  To: ÁNGEL Benitez  Subject: abnormal Bone Scan                               Bone Scan done:  Showed Increased update of the lower thoracic and lumbar spine than seen on previous study seen.  patient declines imaging here today before leaving, needs F/u with PCP for plain films of thoracic and lumbar spine to exclude

## 2022-08-15 PROBLEM — Z00.00 ENCOUNTER FOR PREVENTIVE HEALTH EXAMINATION: Status: RESOLVED | Noted: 2022-05-13 | Resolved: 2022-08-15

## 2022-11-09 DIAGNOSIS — Z71.89 COMPLEX CARE COORDINATION: ICD-10-CM

## 2023-01-03 ENCOUNTER — OFFICE VISIT (OUTPATIENT)
Dept: UROLOGY | Facility: CLINIC | Age: 68
End: 2023-01-03
Payer: COMMERCIAL

## 2023-01-03 VITALS
OXYGEN SATURATION: 97 % | HEIGHT: 69 IN | WEIGHT: 210 LBS | SYSTOLIC BLOOD PRESSURE: 122 MMHG | DIASTOLIC BLOOD PRESSURE: 84 MMHG | TEMPERATURE: 98 F | BODY MASS INDEX: 31.1 KG/M2 | HEART RATE: 80 BPM

## 2023-01-03 DIAGNOSIS — C61 CANCER OF PROSTATE: Primary | ICD-10-CM

## 2023-01-03 DIAGNOSIS — N52.31 ERECTILE DYSFUNCTION AFTER RADICAL PROSTATECTOMY: Chronic | ICD-10-CM

## 2023-01-03 PROCEDURE — 3079F PR MOST RECENT DIASTOLIC BLOOD PRESSURE 80-89 MM HG: ICD-10-PCS | Mod: CPTII,,, | Performed by: NURSE PRACTITIONER

## 2023-01-03 PROCEDURE — 1160F PR REVIEW ALL MEDS BY PRESCRIBER/CLIN PHARMACIST DOCUMENTED: ICD-10-PCS | Mod: CPTII,,, | Performed by: NURSE PRACTITIONER

## 2023-01-03 PROCEDURE — 99214 PR OFFICE/OUTPT VISIT, EST, LEVL IV, 30-39 MIN: ICD-10-PCS | Mod: S$PBB,,, | Performed by: NURSE PRACTITIONER

## 2023-01-03 PROCEDURE — 1160F RVW MEDS BY RX/DR IN RCRD: CPT | Mod: CPTII,,, | Performed by: NURSE PRACTITIONER

## 2023-01-03 PROCEDURE — 3074F PR MOST RECENT SYSTOLIC BLOOD PRESSURE < 130 MM HG: ICD-10-PCS | Mod: CPTII,,, | Performed by: NURSE PRACTITIONER

## 2023-01-03 PROCEDURE — 3008F BODY MASS INDEX DOCD: CPT | Mod: CPTII,,, | Performed by: NURSE PRACTITIONER

## 2023-01-03 PROCEDURE — 1159F MED LIST DOCD IN RCRD: CPT | Mod: CPTII,,, | Performed by: NURSE PRACTITIONER

## 2023-01-03 PROCEDURE — 3008F PR BODY MASS INDEX (BMI) DOCUMENTED: ICD-10-PCS | Mod: CPTII,,, | Performed by: NURSE PRACTITIONER

## 2023-01-03 PROCEDURE — 3074F SYST BP LT 130 MM HG: CPT | Mod: CPTII,,, | Performed by: NURSE PRACTITIONER

## 2023-01-03 PROCEDURE — 1159F PR MEDICATION LIST DOCUMENTED IN MEDICAL RECORD: ICD-10-PCS | Mod: CPTII,,, | Performed by: NURSE PRACTITIONER

## 2023-01-03 PROCEDURE — 3079F DIAST BP 80-89 MM HG: CPT | Mod: CPTII,,, | Performed by: NURSE PRACTITIONER

## 2023-01-03 PROCEDURE — 99214 OFFICE O/P EST MOD 30 MIN: CPT | Mod: PBBFAC | Performed by: NURSE PRACTITIONER

## 2023-01-03 PROCEDURE — 99214 OFFICE O/P EST MOD 30 MIN: CPT | Mod: S$PBB,,, | Performed by: NURSE PRACTITIONER

## 2023-01-03 NOTE — PROGRESS NOTES
Subjective:       Patient ID: Kory Douglas is a 67 y.o. male.    Chief Complaint: 6 month f/u (- no complaints)    UROLOGICAL HISTORY-  MARITZA  psa today. Undetectable.  Doing well w/o complaints.    Previous notes below:    Patient presents for 3 month f/u with PSA prior. His PSA on 02/01/2021 <0.010 undetectable. His numbers have been undetectable. Had surgery January 1, 2019. He got 3 shots every 6 months (18 months of ADT). Had hot flashes with it. Recommend getting back on shots if numbers become detectable and then will refer back to rad/onc.     pathology below    Patient is post radicle 2019. His PSA is 0.010ng/ml on 1/20/2020. The patient has been with an ongoing inna PSA after radical prostatectomy for Topaz's 4+4 disease and tertiary pattern of Topaz's 5 disease. The patient's tumor invades the bladder neck in the resection margins are positive areas.    did not get XRT, did get at least 1 ADT dose.     Plan:  3 months with PSA   If PSA is undectable next visit will go to every 6 months.   *no medication changes today.     Last note below:  Patient presents for 3 month f/u with PSA. His PSA on 10/30/2020 <0.010 undetectable. Discussed had aggressive prostate cancer at high risk for recurrence . Recommend continuing to check PSA every 3 months and if gets above 0.2 will need a scan.   Had one dose of hormone therapy-off of for over 6 months. Not taking Casodex either. Reports his hot flashes are better.     Assessment:  ED  CAP  Hx of elevated PSA    Plan:  F/u in 3 months with PSA prior. .       Last note below:  07/29/2020  xxxxxxxxxxxxxxxxxxxxxxxxxxxxxxxxxxxxxxxxxxxxxxxxxxxxxxxxxxxxxxxxxxxx    Mr. Douglas is a previous patient of Irena Seo and Conner, who is here today to establish continued surveillance of his CAP, GS 4+4, s/p surgery and 18 months of ADT.    Patient here for 6 month f/u. Former patient of Dr. rPieto. Voices no complaints.      -follow up 3 months, if PSA > 0.20 then PET  Axium scan. if + then XRT recommendations for salvage. holding off on ADT at this time.     bad hot flashes. patient was not on vitd/ calcium.     psa is undetectable today.   [12/16/2021]------------------------------------------------------------------------      Mr. Douglas has returned for 6 month f/u for surveillance of CAP, GS 8, s/p surgery 1/2019.  History of 18 mos of ADT.   Tumor invades the bladder neck in the resection margins are positive areas.Denies bleeding, wt loss, bone pain, dysuria, or difficulty emptying bladder.  PVR 18.  Recent Bone Scan performed:  Increased update of the lower thoracic and lumbar spine than seen on previous study seen.F/u with PCP for this imaging and work up.  F/u with PCP for plain films of thoracic and lumbar spine to exclude sclerotic  mass.    Lab Results       Component                Value               Date                       CREATININE               0.96                02/07/2022        [6/21/2022]--------------------------------------------------------------------------    Mr. Douglas is returning today for 6 month f/u of CAP, GS 8 s/p prostatectomy 1/2019.  18 mos of ADT .  PSA remains immeasurable.  No XRT to date and states he is feeling well.  He denies incontinence.  Denies urinary complaints.       -  Cancer of prostate   PSA remains immeasurable at <0.010   No further treatment indicated at this time.   Continue surveillance q 6 months with PSA   Recommend restarting ADT if numbers become detectable and then will refer back to rad/onc.   Continues to take vitamin D and C   Bone Scan done:  Showed Increased update of the lower thoracic and lumbar spine than seen on previous study seen.  patient declines imaging here today before leaving, needs F/u with PCP for plain films of thoracic and lumbar spine to exclude sclerotic  mass.  He elects to F/u with PCP for this, appt on the 9th.    -  Erectile dysfunction after radical prostatectomy   Reports controlled,  continue sildenafil as prescribed.  [1/3/2023]--------------------------------------------------------------------------       Review of Systems   Constitutional: Negative.    Eyes:  Negative for visual disturbance.   Respiratory: Negative.     Cardiovascular: Negative.    Gastrointestinal: Negative.    Genitourinary: Negative.    Musculoskeletal: Negative.    Skin: Negative.    Allergic/Immunologic: Negative.    Neurological: Negative.    Hematological: Negative.    Psychiatric/Behavioral: Negative.       Objective:      Physical Exam  Vitals and nursing note reviewed.   Constitutional:       General: He is not in acute distress.     Appearance: Normal appearance. He is normal weight. He is not ill-appearing, toxic-appearing or diaphoretic.   Eyes:      General: No scleral icterus.  Cardiovascular:      Rate and Rhythm: Normal rate.   Pulmonary:      Effort: Pulmonary effort is normal.   Abdominal:      General: There is no distension.      Palpations: Abdomen is soft.      Tenderness: There is no abdominal tenderness. There is no right CVA tenderness or left CVA tenderness.   Musculoskeletal:      Right lower leg: No edema.      Left lower leg: No edema.   Skin:     General: Skin is warm and dry.      Coloration: Skin is not jaundiced or pale.      Findings: No rash.   Neurological:      Mental Status: He is alert and oriented to person, place, and time. Mental status is at baseline.   Psychiatric:         Mood and Affect: Mood normal.         Behavior: Behavior normal.         Thought Content: Thought content normal.         Judgment: Judgment normal.       Assessment:       1. Cancer of prostate    2. Erectile dysfunction after radical prostatectomy          Plan:       Cancer of prostate  Lab Results   Component Value Date    PSA <0.010 12/16/2022    PSA <0.010 07/21/2022    PSA <0.010 06/17/2022   PSA REMAINS IMMEASURABLE  Patient requests consult with [facility] for 6 month f/u with PSA

## 2023-01-03 NOTE — ASSESSMENT & PLAN NOTE
Lab Results   Component Value Date    PSA <0.010 12/16/2022    PSA <0.010 07/21/2022    PSA <0.010 06/17/2022   · PSA REMAINS IMMEASURABLE  · Patient requests consult with [facility] for 6 month f/u with PSA

## 2023-01-09 ENCOUNTER — OFFICE VISIT (OUTPATIENT)
Dept: FAMILY MEDICINE | Facility: CLINIC | Age: 68
End: 2023-01-09
Payer: COMMERCIAL

## 2023-01-09 VITALS
TEMPERATURE: 98 F | WEIGHT: 204 LBS | DIASTOLIC BLOOD PRESSURE: 86 MMHG | SYSTOLIC BLOOD PRESSURE: 150 MMHG | HEIGHT: 69 IN | BODY MASS INDEX: 30.21 KG/M2 | RESPIRATION RATE: 18 BRPM | HEART RATE: 78 BPM

## 2023-01-09 DIAGNOSIS — I10 ESSENTIAL HYPERTENSION: Primary | ICD-10-CM

## 2023-01-09 DIAGNOSIS — E78.5 HYPERLIPIDEMIA, UNSPECIFIED HYPERLIPIDEMIA TYPE: ICD-10-CM

## 2023-01-09 LAB
ALBUMIN SERPL BCP-MCNC: 4.2 G/DL (ref 3.5–5)
ALBUMIN/GLOB SERPL: 1.2 {RATIO}
ALP SERPL-CCNC: 70 U/L (ref 45–115)
ALT SERPL W P-5'-P-CCNC: 36 U/L (ref 16–61)
ANION GAP SERPL CALCULATED.3IONS-SCNC: 9 MMOL/L (ref 7–16)
AST SERPL W P-5'-P-CCNC: 26 U/L (ref 15–37)
BILIRUB SERPL-MCNC: 0.7 MG/DL (ref ?–1.2)
BUN SERPL-MCNC: 15 MG/DL (ref 7–18)
BUN/CREAT SERPL: 16 (ref 6–20)
CALCIUM SERPL-MCNC: 9 MG/DL (ref 8.5–10.1)
CHLORIDE SERPL-SCNC: 106 MMOL/L (ref 98–107)
CHOLEST SERPL-MCNC: 112 MG/DL (ref 0–200)
CHOLEST/HDLC SERPL: 2.7 {RATIO}
CO2 SERPL-SCNC: 32 MMOL/L (ref 21–32)
CREAT SERPL-MCNC: 0.94 MG/DL (ref 0.7–1.3)
EGFR (NO RACE VARIABLE) (RUSH/TITUS): 88 ML/MIN/1.73M²
GLOBULIN SER-MCNC: 3.4 G/DL (ref 2–4)
GLUCOSE SERPL-MCNC: 97 MG/DL (ref 74–106)
HDLC SERPL-MCNC: 42 MG/DL (ref 40–60)
LDLC SERPL CALC-MCNC: 50 MG/DL
LDLC/HDLC SERPL: 1.2 {RATIO}
NONHDLC SERPL-MCNC: 70 MG/DL
POTASSIUM SERPL-SCNC: 4.5 MMOL/L (ref 3.5–5.1)
PROT SERPL-MCNC: 7.6 G/DL (ref 6.4–8.2)
SODIUM SERPL-SCNC: 142 MMOL/L (ref 136–145)
TRIGL SERPL-MCNC: 101 MG/DL (ref 35–150)
VLDLC SERPL-MCNC: 20 MG/DL

## 2023-01-09 PROCEDURE — 3079F DIAST BP 80-89 MM HG: CPT | Mod: ,,, | Performed by: NURSE PRACTITIONER

## 2023-01-09 PROCEDURE — 3077F PR MOST RECENT SYSTOLIC BLOOD PRESSURE >= 140 MM HG: ICD-10-PCS | Mod: ,,, | Performed by: NURSE PRACTITIONER

## 2023-01-09 PROCEDURE — 1101F PT FALLS ASSESS-DOCD LE1/YR: CPT | Mod: ,,, | Performed by: NURSE PRACTITIONER

## 2023-01-09 PROCEDURE — 1101F PR PT FALLS ASSESS DOC 0-1 FALLS W/OUT INJ PAST YR: ICD-10-PCS | Mod: ,,, | Performed by: NURSE PRACTITIONER

## 2023-01-09 PROCEDURE — 80053 COMPREHEN METABOLIC PANEL: CPT | Mod: ,,, | Performed by: CLINICAL MEDICAL LABORATORY

## 2023-01-09 PROCEDURE — 3079F PR MOST RECENT DIASTOLIC BLOOD PRESSURE 80-89 MM HG: ICD-10-PCS | Mod: ,,, | Performed by: NURSE PRACTITIONER

## 2023-01-09 PROCEDURE — 1159F PR MEDICATION LIST DOCUMENTED IN MEDICAL RECORD: ICD-10-PCS | Mod: ,,, | Performed by: NURSE PRACTITIONER

## 2023-01-09 PROCEDURE — 3008F PR BODY MASS INDEX (BMI) DOCUMENTED: ICD-10-PCS | Mod: ,,, | Performed by: NURSE PRACTITIONER

## 2023-01-09 PROCEDURE — 1159F MED LIST DOCD IN RCRD: CPT | Mod: ,,, | Performed by: NURSE PRACTITIONER

## 2023-01-09 PROCEDURE — 3288F PR FALLS RISK ASSESSMENT DOCUMENTED: ICD-10-PCS | Mod: ,,, | Performed by: NURSE PRACTITIONER

## 2023-01-09 PROCEDURE — 80053 COMPREHENSIVE METABOLIC PANEL: ICD-10-PCS | Mod: ,,, | Performed by: CLINICAL MEDICAL LABORATORY

## 2023-01-09 PROCEDURE — 80061 LIPID PANEL: CPT | Mod: ,,, | Performed by: CLINICAL MEDICAL LABORATORY

## 2023-01-09 PROCEDURE — 3077F SYST BP >= 140 MM HG: CPT | Mod: ,,, | Performed by: NURSE PRACTITIONER

## 2023-01-09 PROCEDURE — 3008F BODY MASS INDEX DOCD: CPT | Mod: ,,, | Performed by: NURSE PRACTITIONER

## 2023-01-09 PROCEDURE — 80061 LIPID PANEL: ICD-10-PCS | Mod: ,,, | Performed by: CLINICAL MEDICAL LABORATORY

## 2023-01-09 PROCEDURE — 99214 OFFICE O/P EST MOD 30 MIN: CPT | Mod: ,,, | Performed by: NURSE PRACTITIONER

## 2023-01-09 PROCEDURE — 3288F FALL RISK ASSESSMENT DOCD: CPT | Mod: ,,, | Performed by: NURSE PRACTITIONER

## 2023-01-09 PROCEDURE — 99214 PR OFFICE/OUTPT VISIT, EST, LEVL IV, 30-39 MIN: ICD-10-PCS | Mod: ,,, | Performed by: NURSE PRACTITIONER

## 2023-01-09 RX ORDER — AMLODIPINE BESYLATE 10 MG/1
10 TABLET ORAL DAILY
Qty: 90 TABLET | Refills: 1 | Status: SHIPPED | OUTPATIENT
Start: 2023-01-09 | End: 2023-07-07 | Stop reason: SDUPTHER

## 2023-01-09 RX ORDER — ROSUVASTATIN CALCIUM 10 MG/1
10 TABLET, COATED ORAL NIGHTLY
Qty: 90 TABLET | Refills: 1 | Status: SHIPPED | OUTPATIENT
Start: 2023-01-09 | End: 2023-07-07 | Stop reason: SDUPTHER

## 2023-01-09 RX ORDER — HYDROCHLOROTHIAZIDE 25 MG/1
25 TABLET ORAL DAILY
Qty: 90 TABLET | Refills: 1 | Status: SHIPPED | OUTPATIENT
Start: 2023-01-09 | End: 2023-07-07 | Stop reason: SDUPTHER

## 2023-01-10 NOTE — PROGRESS NOTES
DRISS Lowe   [facility] [address] [place] [address]  [phone]      PATIENT NAME: Kory Douglas  : 1955  DATE: 23  MRN: [id number]      Billing Provider: DRISS Lowe  Level of Service:   Patient PCP Information       Provider PCP Type    ÁNGEL Benitez [facility]            Reason for Visit / Chief Complaint: Hypertension, Hyperlipidemia, and Follow-up       Update PCP  Update Chief Complaint         History of Present Illness / Problem Focused Workflow     Kory Douglas presents to the clinic with Hypertension, Hyperlipidemia, and Follow-up     Pt presents for routine follow up with lab and med refills. Overall doing well.     Bp mildly elevated today. Pt reports no meds this am.   Advised to monitor BP at home. Advised on optimal BP readings - SBP < 130 & DBP < 80. Advised to call office for any persistent BP elevation and may have to prescribe or adjust BP med(s).  Recommended DASH diet, stay well hydrated with water daily, eliminate or decrease caffeinated and high calorie drinks, increase physical activity, and lose weight if BMI > 25.0.        Review of Systems     Review of Systems   Constitutional:  Negative for fatigue and fever.   HENT:  Negative for nasal congestion and sore throat.    Eyes:  Negative for visual disturbance.   Respiratory:  Negative for chest tightness and shortness of breath.    Cardiovascular:  Negative for chest pain and leg swelling.   Gastrointestinal:  Negative for abdominal pain, change in bowel habit and change in bowel habit.   Endocrine: Negative for polydipsia, polyphagia and polyuria.   Genitourinary:  Negative for dysuria and hematuria.   Musculoskeletal:  Negative for back pain and leg pain.   Integumentary:  Negative for rash.   Neurological:  Negative for dizziness, syncope, weakness and light-headedness.      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 The patient's tumor invades the bladder neck in the resection margins are positive areas. did not get XRT.    Erectile dysfunction after radical prostatectomy 12/16/2021    No meds tried to date Reviewed all methods of ED treatment    Hyperlipidemia     Hypertension        Past Surgical History:   Procedure Laterality Date    ADENOIDECTOMY      PROSTATE SURGERY         Social History  Mr. Douglas  reports that he has never smoked. He has never used smokeless tobacco. He reports that he does not drink alcohol and does not use drugs.    Family History  Mr. Douglas's family history includes No Known Problems in his father and mother.    Medications and Allergies     Medications  Outpatient Medications Marked as Taking for the 1/9/23 encounter (Office Visit) with DRISS Lowe   Medication Sig Dispense Refill    calcium carbonate (OS-SOL) 500 mg calcium (1,250 mg) tablet Take 1 tablet (500 mg total) by mouth once daily. 90 tablet 3       Allergies  Review of patient's allergies indicates:  No Known Allergies    Physical Examination     Vitals:    01/09/23 0857   BP: (!) 150/86   Pulse: 78   Resp: 18   Temp: 98.1 °F (36.7 °C)     Physical Exam  Eyes:      Pupils: Pupils are equal, round, and reactive to light.   Cardiovascular:      Rate and Rhythm: Normal rate and regular rhythm.      Heart sounds: No murmur heard.  Pulmonary:      Breath sounds: Normal breath sounds. No wheezing, rhonchi or rales.   Abdominal:      General: Bowel sounds are normal.   Musculoskeletal:         General: No swelling.      Cervical back: Normal range of motion and neck supple.   Skin:     General: Skin is warm and dry.   Neurological:      Mental Status: He is alert and oriented to person, place, and time.        Sodium   Date Value Ref Range Status   01/09/2023 142  136 - 145 mmol/L Final     Potassium   Date Value Ref Range Status   01/09/2023 4.5 3.5 - 5.1 mmol/L Final     Chloride   Date Value Ref Range Status   01/09/2023 106 98 - 107 mmol/L Final     CO2   Date Value Ref Range Status   01/09/2023 32 21 - 32 mmol/L Final     Anion Gap   Date Value Ref Range Status   01/09/2023 9 7 - 16 mmol/L Final     Glucose   Date Value Ref Range Status   01/09/2023 97 74 - 106 mg/dL Final     BUN   Date Value Ref Range Status   01/09/2023 15 7 - 18 mg/dL Final     Calcium   Date Value Ref Range Status   01/09/2023 9.0 8.5 - 10.1 mg/dL Final     Total Protein   Date Value Ref Range Status   01/09/2023 7.6 6.4 - 8.2 g/dL Final     Albumin   Date Value Ref Range Status   01/09/2023 4.2 3.5 - 5.0 g/dL Final     A/G Ratio   Date Value Ref Range Status   01/09/2023 1.2  Final     Bilirubin, Total   Date Value Ref Range Status   01/09/2023 0.7 >0.0 - 1.2 mg/dL Final     ALT   Date Value Ref Range Status   01/09/2023 36 16 - 61 U/L Final     AST   Date Value Ref Range Status   01/09/2023 26 15 - 37 U/L Final     eGFR   Date Value Ref Range Status   07/21/2022 89 >=60 mL/min/1.73m² Final     eGFR    Date Value Ref Range Status   09/01/2021 108 >=60 mL/min/1.73m² Final        Cholesterol   Date Value Ref Range Status   01/09/2023 112 0 - 200 mg/dL Final     Comment:       <200 mg/dL:Desirable  200-240 mg/dL:Borderline High  >240 mg/dL:High     HDL Cholesterol   Date Value Ref Range Status   01/09/2023 42 40 - 60 mg/dL Final     Comment:       <40 mg/dL:Low HDL  40-60 mg/dL:Normal  >60 mg/dL:Desirable     LDL Calculated   Date Value Ref Range Status   01/09/2023 50 mg/dL Final     Comment:     Unable to calculate due to one of the following values:  Cholesterol <5  HDL Cholesterol <5  Triglycerides <10 or >400     Triglycerides   Date Value Ref Range Status   01/09/2023 101 35 - 150 mg/dL Final     Comment:       Normal:<150 mg/dL  Borderline High:150-199 mg/dL  High:200-499  mg/dL  Very High:>=500        Hemoglobin A1C   Date Value Ref Range Status   07/21/2022 5.6 4.5 - 6.6 % Final     Comment:       Normal:               <5.7%  Pre-Diabetic:       5.7% to 6.4%  Diabetic:             >6.4%  Diabetic Goal:     <7%        WBC   Date Value Ref Range Status   07/21/2022 7.48 4.50 - 11.00 K/uL Final     Hemoglobin   Date Value Ref Range Status   07/21/2022 13.1 (L) 13.5 - 18.0 g/dL Final     Hematocrit   Date Value Ref Range Status   07/21/2022 41.5 40.0 - 54.0 % Final     Platelet Count   Date Value Ref Range Status   07/21/2022 273 150 - 400 K/uL Final      Assessment and Plan (including Health Maintenance)      Problem List  Smart Sets  Document Outside HM   :    Plan:   Cont home meds  Goal bp 130/80      Health Maintenance Due   Topic Date Due    TETANUS VACCINE  Never done    Influenza Vaccine (1) Never done    Diabetes Urine Screening  02/07/2023       Problem List Items Addressed This Visit          Cardiac/Vascular    Essential hypertension - Primary    Relevant Medications    hydroCHLOROthiazide (HYDRODIURIL) 25 MG tablet    amLODIPine (NORVASC) 10 MG tablet    Other Relevant Orders    Comprehensive Metabolic Panel (Completed)    Hyperlipidemia    Relevant Medications    rosuvastatin (CRESTOR) 10 MG tablet    Other Relevant Orders    Lipid Panel (Completed)       Health Maintenance Topics with due status: Not Due       Topic Last Completion Date    Colorectal Cancer Screening 06/19/2019    Pneumococcal Vaccines (Age 65+) 05/09/2022    Hemoglobin A1c 07/21/2022    PROSTATE-SPECIFIC ANTIGEN 12/16/2022    Lipid Panel 01/09/2023       Future Appointments   Date Time Provider Department Center   5/10/2023 11:00 AM AWKRYSTEN NURSE, [facility]   7/10/2023  8:00 AM DRISS Lowe [facility] [place]            Signature:  DRISS Lowe [facility]  [address] [address]  [phone]    Date of encounter: 1/9/23

## 2023-02-01 ENCOUNTER — PATIENT OUTREACH (OUTPATIENT)
Dept: FAMILY MEDICINE | Facility: CLINIC | Age: 68
End: 2023-02-01
Payer: COMMERCIAL

## 2023-05-04 ENCOUNTER — PATIENT OUTREACH (OUTPATIENT)
Dept: ADMINISTRATIVE | Facility: HOSPITAL | Age: 68
End: 2023-05-04

## 2023-05-04 NOTE — PROGRESS NOTES
Health Maintenance Due   Topic Date Due    Diabetes Urine Screening  02/07/2023    Pneumococcal Vaccines (Age 65+) (3 - PCV) 05/09/2023 05/04/2023   Care Everywhere updates requested and reviewed.  Media reports reviewed.  [facility] and [facility] reviewed.  [employer] reviewed, Shingles Vaccine x1 and Tetanus vaccine x1 uploaded  [facility] EXTRACTED  Next appointment 05/10/2023 . Appointment notes updated to include: LAST BP NONCOMPLIANT-PT IS DUE FOR DM URINE

## 2023-05-10 ENCOUNTER — OFFICE VISIT (OUTPATIENT)
Dept: FAMILY MEDICINE | Facility: CLINIC | Age: 68
End: 2023-05-10
Payer: COMMERCIAL

## 2023-05-10 VITALS
WEIGHT: 195 LBS | OXYGEN SATURATION: 98 % | BODY MASS INDEX: 28.88 KG/M2 | DIASTOLIC BLOOD PRESSURE: 77 MMHG | HEART RATE: 83 BPM | HEIGHT: 69 IN | SYSTOLIC BLOOD PRESSURE: 129 MMHG | RESPIRATION RATE: 20 BRPM

## 2023-05-10 DIAGNOSIS — N52.31 ERECTILE DYSFUNCTION AFTER RADICAL PROSTATECTOMY: ICD-10-CM

## 2023-05-10 DIAGNOSIS — Z00.00 ENCOUNTER FOR SUBSEQUENT ANNUAL WELLNESS VISIT (AWV) IN MEDICARE PATIENT: Primary | ICD-10-CM

## 2023-05-10 DIAGNOSIS — I10 ESSENTIAL HYPERTENSION: ICD-10-CM

## 2023-05-10 DIAGNOSIS — Z00.00 ENCOUNTER FOR PREVENTIVE HEALTH EXAMINATION: ICD-10-CM

## 2023-05-10 DIAGNOSIS — E78.5 HYPERLIPIDEMIA, UNSPECIFIED HYPERLIPIDEMIA TYPE: ICD-10-CM

## 2023-05-10 PROCEDURE — 90677 PNEUMOCOCCAL CONJUGATE VACCINE 20-VALENT: ICD-10-PCS | Mod: ,,, | Performed by: NURSE PRACTITIONER

## 2023-05-10 PROCEDURE — 3008F BODY MASS INDEX DOCD: CPT | Mod: ,,, | Performed by: NURSE PRACTITIONER

## 2023-05-10 PROCEDURE — G0009 ADMIN PNEUMOCOCCAL VACCINE: HCPCS | Mod: ,,, | Performed by: NURSE PRACTITIONER

## 2023-05-10 PROCEDURE — 1101F PT FALLS ASSESS-DOCD LE1/YR: CPT | Mod: ,,, | Performed by: NURSE PRACTITIONER

## 2023-05-10 PROCEDURE — 1160F RVW MEDS BY RX/DR IN RCRD: CPT | Mod: ,,, | Performed by: NURSE PRACTITIONER

## 2023-05-10 PROCEDURE — 1101F PR PT FALLS ASSESS DOC 0-1 FALLS W/OUT INJ PAST YR: ICD-10-PCS | Mod: ,,, | Performed by: NURSE PRACTITIONER

## 2023-05-10 PROCEDURE — G0439 PR MEDICARE ANNUAL WELLNESS SUBSEQUENT VISIT: ICD-10-PCS | Mod: ,,, | Performed by: NURSE PRACTITIONER

## 2023-05-10 PROCEDURE — 1159F PR MEDICATION LIST DOCUMENTED IN MEDICAL RECORD: ICD-10-PCS | Mod: ,,, | Performed by: NURSE PRACTITIONER

## 2023-05-10 PROCEDURE — 90677 PCV20 VACCINE IM: CPT | Mod: ,,, | Performed by: NURSE PRACTITIONER

## 2023-05-10 PROCEDURE — 1160F PR REVIEW ALL MEDS BY PRESCRIBER/CLIN PHARMACIST DOCUMENTED: ICD-10-PCS | Mod: ,,, | Performed by: NURSE PRACTITIONER

## 2023-05-10 PROCEDURE — 3074F SYST BP LT 130 MM HG: CPT | Mod: ,,, | Performed by: NURSE PRACTITIONER

## 2023-05-10 PROCEDURE — 3074F PR MOST RECENT SYSTOLIC BLOOD PRESSURE < 130 MM HG: ICD-10-PCS | Mod: ,,, | Performed by: NURSE PRACTITIONER

## 2023-05-10 PROCEDURE — 3288F FALL RISK ASSESSMENT DOCD: CPT | Mod: ,,, | Performed by: NURSE PRACTITIONER

## 2023-05-10 PROCEDURE — 3008F PR BODY MASS INDEX (BMI) DOCUMENTED: ICD-10-PCS | Mod: ,,, | Performed by: NURSE PRACTITIONER

## 2023-05-10 PROCEDURE — 1159F MED LIST DOCD IN RCRD: CPT | Mod: ,,, | Performed by: NURSE PRACTITIONER

## 2023-05-10 PROCEDURE — G0439 PPPS, SUBSEQ VISIT: HCPCS | Mod: ,,, | Performed by: NURSE PRACTITIONER

## 2023-05-10 PROCEDURE — 3288F PR FALLS RISK ASSESSMENT DOCUMENTED: ICD-10-PCS | Mod: ,,, | Performed by: NURSE PRACTITIONER

## 2023-05-10 PROCEDURE — 3078F PR MOST RECENT DIASTOLIC BLOOD PRESSURE < 80 MM HG: ICD-10-PCS | Mod: ,,, | Performed by: NURSE PRACTITIONER

## 2023-05-10 PROCEDURE — 3078F DIAST BP <80 MM HG: CPT | Mod: ,,, | Performed by: NURSE PRACTITIONER

## 2023-05-10 PROCEDURE — G0009 PNEUMOCOCCAL CONJUGATE VACCINE 20-VALENT: ICD-10-PCS | Mod: ,,, | Performed by: NURSE PRACTITIONER

## 2023-05-10 PROCEDURE — 1126F AMNT PAIN NOTED NONE PRSNT: CPT | Mod: ,,, | Performed by: NURSE PRACTITIONER

## 2023-05-10 PROCEDURE — 1126F PR PAIN SEVERITY QUANTIFIED, NO PAIN PRESENT: ICD-10-PCS | Mod: ,,, | Performed by: NURSE PRACTITIONER

## 2023-05-10 NOTE — PROGRESS NOTES
[facility]       PATIENT NAME: Kory Douglas   : 1955    AGE: 67 y.o. DATE: 05/10/2023   MRN: [id number]        Reason for Visit / Chief Complaint: Medicare AWV Follow Up ([facility] WELLNESS SUBSEQUENT VISIT)        Kory Doulgas presents for an Subsequent Medicare AWV today.     The following components were reviewed and updated:    Medical/Social/Family History:  Past Medical History:   Diagnosis Date    Cancer of prostate 2021     radical prostatectomy for Banks's 4+4 disease and tertiary pattern of Clari's 5 disease. The patient's tumor invades the bladder neck in the resection margins are positive areas. did not get XRT.    Erectile dysfunction after radical prostatectomy 2021    No meds tried to date Reviewed all methods of ED treatment    Hyperlipidemia     Hypertension     Personal history of colonic polyps 2019        Family History   Problem Relation Age of Onset    No Known Problems Mother     No Known Problems Father         Social History     Tobacco Use   Smoking Status Never   Smokeless Tobacco Never      Social History     Substance and Sexual Activity   Alcohol Use Never       Family History   Problem Relation Age of Onset    No Known Problems Mother     No Known Problems Father        Past Surgical History:   Procedure Laterality Date    ADENOIDECTOMY      COLONOSCOPY W/ BIOPSIES  2019    PROSTATE SURGERY           Allergies and Current Medications   Review of patient's allergies indicates:  No Known Allergies    Current Outpatient Medications:     amLODIPine (NORVASC) 10 MG tablet, Take 1 tablet (10 mg total) by mouth once daily., Disp: 90 tablet, Rfl: 1    calcium carbonate (OS-SOL) 500 mg calcium (1,250 mg) tablet, Take 1 tablet (500 mg total) by mouth once daily., Disp: 90 tablet, Rfl: 3    hydroCHLOROthiazide (HYDRODIURIL) 25 MG tablet, Take 1 tablet (25 mg total) by mouth once daily., Disp: 90  tablet, Rfl: 1    rosuvastatin (CRESTOR) 10 MG tablet, Take 1 tablet (10 mg total) by mouth every evening., Disp: 90 tablet, Rfl: 1    sildenafiL (VIAGRA) 25 MG tablet, Take 1-5 tabs (25mg - 100 mg) po one hour prior to intercourse on an empty stomach.  Use lowest effective dose., Disp: 40 tablet, Rfl: 5    Health Risk Assessment   Fall Risk: No   Obesity: BMI 28.80     Advance Directive:  Does not have an advanced directive. Verbal education and written education included in today's AVS.    X Patient is interested in learning more about how to make advanced directives.  I provided them paperwork and offered to discuss this with them.   Depression: PHQ9 -0    HTN: 129/77   T2DM: N/A   Tobacco use: Denies tobacco use  STI: Not at risk    Alcohol misuse: Denies alcohol use Cage Score: N/A   Statin Use: Continue statin use. Encouraged heart healthy diet & exercise   M[employer]      Health Risk Assessment  What is your age?: 65-69  Are you male or female?: Male  During the past four weeks, how much have you been bothered by emotional problems such as feeling anxious, depressed, irritable, sad, or downhearted and blue?: Not at all  During the past five weeks, has your physical and/or emotional health limited your social activities with family, friends, neighbors, or groups?: Not at all  During the past four weeks, how much bodily pain have you generally had?: No pain  During the past four weeks, was someone available to help if you needed and wanted help?: Yes, as much as I wanted  During the past four weeks, what was the hardest physical activity you could do for at least two minutes?: Moderate  Can you get to places out of walking distance without help?  (For example, can you travel alone on buses or taxis, or drive your own car?): Yes  Can you go shopping for groceries or clothes without someone's help?: Yes  Can you prepare your own meals?: Yes  Can you do your own housework without help?: Yes  Because of any  health problems, do you need the help of another person with your personal care needs such as eating, bathing, dressing, or getting around the house?: No  Can you handle your own money without help?: Yes  During the past four weeks, how would you rate your health in general?: Very good  How have things been going for you during the past four weeks?: Pretty well  Are you having difficulties driving your car?: No  Do you always fasten your seat belt when you are in a car?: Yes, usually  How often in the past four weeks have you been bothered by falling or dizzy when standing up?: Never  How often in the past four weeks have you been bothered by sexual problems?: Sometimes  How often in the past four weeks have you been bothered by trouble eating well?: Never  How often in the past four weeks have you been bothered by teeth or denture problems?: Never  How often in the past four weeks have you been bothered with problems using the telephone?: Never  How often in the past four weeks have you been bothered by tiredness or fatigue?: Sometimes  Have you fallen two or more times in the past year?: No  Are you afraid of falling?: No  Are you a smoker?: No  During the past four weeks, how many drinks of wine, beer, or other alcoholic beverages did you have?: No alcohol at all  Do you exercise for about 20 minutes three or more days a week?: Yes, most of the time  Have you been given any information to help you with hazards in your house that might hurt you?: Yes  Have you been given any information to help you with keeping track of your medications?: Yes  How often do you have trouble taking medicines the way you've been told to take them?: I always take them as prescribed  How confident are you that you can control and manage most of your health problems?: Very confident  What is your race? (Check all that apply.):     Opioid Risk Assessment:  Opioid risk assessment performed today. Patient is LOW risk for  opoid abuse.     Opioid Risk Score         Value Time User    Opioid Risk Score  0 5/10/2023 10:47 AM Emi Diego, RN                 Health Maintenance   Last eye exam: > 3 years ago. Recommended to get done this year   Last CV screen with lipids: 01/09/2023   Diabetes screening with fasting glucose or A1c: 01/09/2023   Colonoscopy: 06/09/2019- Repeat in 5 years. Due again in 2024   Flu Vaccine: 10/06/2022   Pneumonia vaccines: 05/09/2022; 05/10/2023   COVID vaccine: 03/18/2021; 04/15/2021; 10/29/2021; 04/28/2022; 10/06/2022   Hep B vaccine: Not at risk   DEXA: N/A   Last pap/pelvic: N/A   Last Mammogram: N/A   Last PSA screen: 12/16/2022- Normal   AAA screening: N/A (once in lifetime for males 65-75 who have smoked > 100 cigarettes in lifetime)  HIV Screeing: N/A  Hepatitis C Screen: 04/26/2022- Nonreactive  Low Dose CT Scan: N/A    Health Maintenance Topics with due status: Not Due       Topic Last Completion Date    Colorectal Cancer Screening 06/19/2019    Hemoglobin A1c (Prediabetes) 07/21/2022    PROSTATE-SPECIFIC ANTIGEN 12/16/2022    Lipid Panel 01/09/2023    TETANUS VACCINE 03/25/2023    Shingles Vaccine 03/25/2023     Health Maintenance Due   Topic Date Due    Pneumococcal Vaccines (Age 65+) (3 - PCV) 05/09/2023       Incontinence  Bowel: No  Bladder: No    Lab results available in Epic or see dates from [facility] above:   Lab Results   Component Value Date    CHOL 112 01/09/2023    CHOL 105 07/21/2022    CHOL 135 02/07/2022     Lab Results   Component Value Date    HDL 42 01/09/2023    HDL 38 (L) 07/21/2022    HDL 40 02/07/2022     Lab Results   Component Value Date    LDLCALC 50 01/09/2023    LDLCALC 45 07/21/2022    LDLCALC 68 02/07/2022     Lab Results   Component Value Date    TRIG 101 01/09/2023    TRIG 110 07/21/2022    TRIG 137 02/07/2022     Lab Results   Component Value Date    CHOLHDL 2.7 01/09/2023    CHOLHDL 2.8 07/21/2022    CHOLHDL 3.4 02/07/2022       Lab Results   Component Value Date     HGBA1C 5.6 07/21/2022       Sodium   Date Value Ref Range Status   01/09/2023 142 136 - 145 mmol/L Final     Potassium   Date Value Ref Range Status   01/09/2023 4.5 3.5 - 5.1 mmol/L Final     Chloride   Date Value Ref Range Status   01/09/2023 106 98 - 107 mmol/L Final     CO2   Date Value Ref Range Status   01/09/2023 32 21 - 32 mmol/L Final     Glucose   Date Value Ref Range Status   01/09/2023 97 74 - 106 mg/dL Final     BUN   Date Value Ref Range Status   01/09/2023 15 7 - 18 mg/dL Final     Creatinine   Date Value Ref Range Status   01/09/2023 0.94 0.70 - 1.30 mg/dL Final     Calcium   Date Value Ref Range Status   01/09/2023 9.0 8.5 - 10.1 mg/dL Final     Total Protein   Date Value Ref Range Status   01/09/2023 7.6 6.4 - 8.2 g/dL Final     Albumin   Date Value Ref Range Status   01/09/2023 4.2 3.5 - 5.0 g/dL Final     Bilirubin, Total   Date Value Ref Range Status   01/09/2023 0.7 >0.0 - 1.2 mg/dL Final     Alk Phos   Date Value Ref Range Status   01/09/2023 70 45 - 115 U/L Final     AST   Date Value Ref Range Status   01/09/2023 26 15 - 37 U/L Final     ALT   Date Value Ref Range Status   01/09/2023 36 16 - 61 U/L Final     Anion Gap   Date Value Ref Range Status   01/09/2023 9 7 - 16 mmol/L Final     eGFR    Date Value Ref Range Status   09/01/2021 108 >=60 mL/min/1.73m² Final     eGFR   Date Value Ref Range Status   07/21/2022 89 >=60 mL/min/1.73m² Final         Lab Results   Component Value Date    PSA <0.010 12/16/2022    PSA <0.010 07/21/2022    PSA <0.010 06/17/2022          Care Team   PCP: DRISS Jeong    Urology: Myrtle       **See Completed Assessments for Annual Wellness visit within the encounter summary    The following assessments were completed & reviewed:  Depression Screening  Cognitive function Screening  Timed Get Up Test  Whisper Test  Vision Screen  Health Risk Assessment  Checklist of ADLs and IADLs      Objective  Vitals:    05/10/23 1044   BP: 129/77  "  Pulse: 83   Resp: 20   SpO2: 98%   Weight: 88.5 kg (195 lb)   Height: 5' 9" (1.753 m)   PainSc: 0-No pain      Body mass index is 28.8 kg/m².  Ideal body weight: 70.7 kg (155 lb 13.8 oz)       Physical Exam  Constitutional:       General: He is not in acute distress.     Appearance: Normal appearance.   Eyes:      Pupils: Pupils are equal, round, and reactive to light.   Cardiovascular:      Rate and Rhythm: Normal rate and regular rhythm.      Heart sounds: Normal heart sounds. No murmur heard.  Pulmonary:      Effort: Pulmonary effort is normal.      Breath sounds: Normal breath sounds.   Abdominal:      General: Bowel sounds are normal. There is no distension.      Hernia: No hernia is present.   Musculoskeletal:         General: No swelling or tenderness.      Right lower leg: No edema.      Left lower leg: No edema.   Skin:     General: Skin is warm and dry.   Neurological:      General: No focal deficit present.      Mental Status: He is alert and oriented to person, place, and time.         Assessment:     1. Encounter for subsequent annual wellness visit (AWV) in Medicare patient    2. Essential hypertension    3. Hyperlipidemia, unspecified hyperlipidemia type    4. BMI 28.0-28.9,adult    5. Erectile dysfunction after radical prostatectomy    6. Encounter for preventive health examination         Plan:    Referrals/Orders:  Prevnar  20    Advised to call office if does not hear from anyone with referral appt within 2-3 weeks to check on status of referral. Voiced understanding.    Keep current on appts & continue medications as ordered.       Discussed and provided with a screening schedule and personal prevention plan in accordance with USPSTF age appropriate recommendations and Medicare screening guidelines.   Education, counseling, and referrals were provided as needed.  After Visit Summary printed and given to patient which includes written education and a list of any referrals if indicated. All " education discussed with patient & handouts given to patient.      F/u plan for yearly AWV.    Signature: DRISS Jeong

## 2023-05-10 NOTE — PATIENT INSTRUCTIONS
Counseling and Referral of Other Preventative  (Italic type indicates deductible and co-insurance are waived)    Patient Name: Kory Douglas  Today's Date: 5/10/2023    Health Maintenance       Date Due Completion Date    Pneumococcal Vaccines (Age 65+) (3 - PCV) 05/09/2023 5/9/2022    Shingles Vaccine (1 of 2) 05/20/2023 3/25/2023    Hemoglobin A1c (Prediabetes) 07/21/2023 7/21/2022    PROSTATE-SPECIFIC ANTIGEN 12/16/2023 12/16/2022    Colorectal Cancer Screening 06/19/2024 6/19/2019    Lipid Panel 01/09/2028 1/9/2023    TETANUS VACCINE 03/25/2033 3/25/2023        No orders of the defined types were placed in this encounter.      The following information is provided to all patients.  This information is to help you find resources for any of the problems found today that may be affecting your health:                Living healthy guide: www.[facility].[place]      Understanding Diabetes: www.diabetes.org      Eating healthy: www.cdc.gov/healthyweight      CDC home safety checklist: www.cdc.gov/steadi/patient.html      Agency on Aging: www.goea.[place].[facility]      Alcoholics anonymous (AA): www.aa.org      Physical Activity: www.aron.nih.gov/pq0oglu      Tobacco use: www.quitwithusla.org

## 2023-06-09 DIAGNOSIS — Z71.89 COMPLEX CARE COORDINATION: ICD-10-CM

## 2023-06-26 ENCOUNTER — TELEPHONE (OUTPATIENT)
Dept: UROLOGY | Facility: CLINIC | Age: 68
End: 2023-06-26
Payer: COMMERCIAL

## 2023-06-26 ENCOUNTER — OFFICE VISIT (OUTPATIENT)
Dept: UROLOGY | Facility: CLINIC | Age: 68
End: 2023-06-26
Payer: COMMERCIAL

## 2023-06-26 VITALS
SYSTOLIC BLOOD PRESSURE: 102 MMHG | TEMPERATURE: 99 F | HEIGHT: 69 IN | RESPIRATION RATE: 18 BRPM | WEIGHT: 195 LBS | OXYGEN SATURATION: 99 % | BODY MASS INDEX: 28.88 KG/M2 | HEART RATE: 62 BPM | DIASTOLIC BLOOD PRESSURE: 70 MMHG

## 2023-06-26 DIAGNOSIS — N52.31 ERECTILE DYSFUNCTION AFTER RADICAL PROSTATECTOMY: Chronic | ICD-10-CM

## 2023-06-26 DIAGNOSIS — C61 CANCER OF PROSTATE: Primary | ICD-10-CM

## 2023-06-26 PROCEDURE — 99213 OFFICE O/P EST LOW 20 MIN: CPT | Mod: S$PBB,,, | Performed by: NURSE PRACTITIONER

## 2023-06-26 PROCEDURE — 1126F AMNT PAIN NOTED NONE PRSNT: CPT | Mod: CPTII,,, | Performed by: NURSE PRACTITIONER

## 2023-06-26 PROCEDURE — 1126F PR PAIN SEVERITY QUANTIFIED, NO PAIN PRESENT: ICD-10-PCS | Mod: CPTII,,, | Performed by: NURSE PRACTITIONER

## 2023-06-26 PROCEDURE — 99213 PR OFFICE/OUTPT VISIT, EST, LEVL III, 20-29 MIN: ICD-10-PCS | Mod: S$PBB,,, | Performed by: NURSE PRACTITIONER

## 2023-06-26 PROCEDURE — 3008F PR BODY MASS INDEX (BMI) DOCUMENTED: ICD-10-PCS | Mod: CPTII,,, | Performed by: NURSE PRACTITIONER

## 2023-06-26 PROCEDURE — 99215 OFFICE O/P EST HI 40 MIN: CPT | Mod: PBBFAC | Performed by: NURSE PRACTITIONER

## 2023-06-26 PROCEDURE — 3008F BODY MASS INDEX DOCD: CPT | Mod: CPTII,,, | Performed by: NURSE PRACTITIONER

## 2023-06-26 PROCEDURE — 1160F PR REVIEW ALL MEDS BY PRESCRIBER/CLIN PHARMACIST DOCUMENTED: ICD-10-PCS | Mod: CPTII,,, | Performed by: NURSE PRACTITIONER

## 2023-06-26 PROCEDURE — 3074F SYST BP LT 130 MM HG: CPT | Mod: CPTII,,, | Performed by: NURSE PRACTITIONER

## 2023-06-26 PROCEDURE — 3074F PR MOST RECENT SYSTOLIC BLOOD PRESSURE < 130 MM HG: ICD-10-PCS | Mod: CPTII,,, | Performed by: NURSE PRACTITIONER

## 2023-06-26 PROCEDURE — 3078F PR MOST RECENT DIASTOLIC BLOOD PRESSURE < 80 MM HG: ICD-10-PCS | Mod: CPTII,,, | Performed by: NURSE PRACTITIONER

## 2023-06-26 PROCEDURE — 1159F MED LIST DOCD IN RCRD: CPT | Mod: CPTII,,, | Performed by: NURSE PRACTITIONER

## 2023-06-26 PROCEDURE — 3078F DIAST BP <80 MM HG: CPT | Mod: CPTII,,, | Performed by: NURSE PRACTITIONER

## 2023-06-26 PROCEDURE — 1159F PR MEDICATION LIST DOCUMENTED IN MEDICAL RECORD: ICD-10-PCS | Mod: CPTII,,, | Performed by: NURSE PRACTITIONER

## 2023-06-26 PROCEDURE — 1160F RVW MEDS BY RX/DR IN RCRD: CPT | Mod: CPTII,,, | Performed by: NURSE PRACTITIONER

## 2023-06-26 NOTE — TELEPHONE ENCOUNTER
----- Message from Rico Garcia NP sent at 6/26/2023  1:25 PM CDT -----  Please let patient know his PSA was good at <0.010 which is still immeasurable, thanks   I called and spoke with the pt and informed him of the above message.  He said that is good, that's ain't nothing but the good Lord working for him, and thanked me for calling.  He voiced understanding.

## 2023-06-26 NOTE — PATIENT INSTRUCTIONS
PSA today and repeat in 6 months   Continue Viagra as prescribed   Follow up with Urology NP OSCAR Correia in 6 months or sooner if needed

## 2023-07-10 ENCOUNTER — OFFICE VISIT (OUTPATIENT)
Dept: FAMILY MEDICINE | Facility: CLINIC | Age: 68
End: 2023-07-10
Payer: COMMERCIAL

## 2023-07-10 VITALS
BODY MASS INDEX: 29.47 KG/M2 | WEIGHT: 199 LBS | HEIGHT: 69 IN | TEMPERATURE: 97 F | HEART RATE: 67 BPM | DIASTOLIC BLOOD PRESSURE: 79 MMHG | OXYGEN SATURATION: 100 % | SYSTOLIC BLOOD PRESSURE: 121 MMHG | RESPIRATION RATE: 16 BRPM

## 2023-07-10 DIAGNOSIS — E78.5 HYPERLIPIDEMIA, UNSPECIFIED HYPERLIPIDEMIA TYPE: ICD-10-CM

## 2023-07-10 DIAGNOSIS — I10 ESSENTIAL HYPERTENSION: Primary | ICD-10-CM

## 2023-07-10 DIAGNOSIS — I10 ESSENTIAL HYPERTENSION: ICD-10-CM

## 2023-07-10 LAB
ALBUMIN SERPL BCP-MCNC: 3.8 G/DL (ref 3.5–5)
ALBUMIN/GLOB SERPL: 1.1 {RATIO}
ALP SERPL-CCNC: 63 U/L (ref 45–115)
ALT SERPL W P-5'-P-CCNC: 32 U/L (ref 16–61)
ANION GAP SERPL CALCULATED.3IONS-SCNC: 7 MMOL/L (ref 7–16)
AST SERPL W P-5'-P-CCNC: 21 U/L (ref 15–37)
BASOPHILS # BLD AUTO: 0.07 K/UL (ref 0–0.2)
BASOPHILS NFR BLD AUTO: 0.9 % (ref 0–1)
BILIRUB SERPL-MCNC: 0.7 MG/DL (ref ?–1.2)
BUN SERPL-MCNC: 12 MG/DL (ref 7–18)
BUN/CREAT SERPL: 13 (ref 6–20)
CALCIUM SERPL-MCNC: 8.3 MG/DL (ref 8.5–10.1)
CHLORIDE SERPL-SCNC: 110 MMOL/L (ref 98–107)
CHOLEST SERPL-MCNC: 105 MG/DL (ref 0–200)
CHOLEST/HDLC SERPL: 2.6 {RATIO}
CO2 SERPL-SCNC: 27 MMOL/L (ref 21–32)
CREAT SERPL-MCNC: 0.89 MG/DL (ref 0.7–1.3)
DIFFERENTIAL METHOD BLD: ABNORMAL
EGFR (NO RACE VARIABLE) (RUSH/TITUS): 93 ML/MIN/1.73M2
EOSINOPHIL # BLD AUTO: 0.12 K/UL (ref 0–0.5)
EOSINOPHIL NFR BLD AUTO: 1.5 % (ref 1–4)
ERYTHROCYTE [DISTWIDTH] IN BLOOD BY AUTOMATED COUNT: 13.9 % (ref 11.5–14.5)
GLOBULIN SER-MCNC: 3.5 G/DL (ref 2–4)
GLUCOSE SERPL-MCNC: 90 MG/DL (ref 74–106)
HCT VFR BLD AUTO: 43.1 % (ref 40–54)
HDLC SERPL-MCNC: 40 MG/DL (ref 40–60)
HGB BLD-MCNC: 13.1 G/DL (ref 13.5–18)
IMM GRANULOCYTES # BLD AUTO: 0.02 K/UL (ref 0–0.04)
IMM GRANULOCYTES NFR BLD: 0.3 % (ref 0–0.4)
LDLC SERPL CALC-MCNC: 49 MG/DL
LDLC/HDLC SERPL: 1.2 {RATIO}
LYMPHOCYTES # BLD AUTO: 3.3 K/UL (ref 1–4.8)
LYMPHOCYTES NFR BLD AUTO: 41.4 % (ref 27–41)
MCH RBC QN AUTO: 27.6 PG (ref 27–31)
MCHC RBC AUTO-ENTMCNC: 30.4 G/DL (ref 32–36)
MCV RBC AUTO: 90.9 FL (ref 80–96)
MONOCYTES # BLD AUTO: 0.45 K/UL (ref 0–0.8)
MONOCYTES NFR BLD AUTO: 5.6 % (ref 2–6)
MPC BLD CALC-MCNC: 10.6 FL (ref 9.4–12.4)
NEUTROPHILS # BLD AUTO: 4.01 K/UL (ref 1.8–7.7)
NEUTROPHILS NFR BLD AUTO: 50.3 % (ref 53–65)
NONHDLC SERPL-MCNC: 65 MG/DL
NRBC # BLD AUTO: 0 X10E3/UL
NRBC, AUTO (.00): 0 %
PLATELET # BLD AUTO: 243 K/UL (ref 150–400)
POTASSIUM SERPL-SCNC: 4 MMOL/L (ref 3.5–5.1)
PROT SERPL-MCNC: 7.3 G/DL (ref 6.4–8.2)
RBC # BLD AUTO: 4.74 M/UL (ref 4.6–6.2)
SODIUM SERPL-SCNC: 140 MMOL/L (ref 136–145)
TRIGL SERPL-MCNC: 81 MG/DL (ref 35–150)
VLDLC SERPL-MCNC: 16 MG/DL
WBC # BLD AUTO: 7.97 K/UL (ref 4.5–11)

## 2023-07-10 PROCEDURE — 3008F PR BODY MASS INDEX (BMI) DOCUMENTED: ICD-10-PCS | Mod: ,,, | Performed by: NURSE PRACTITIONER

## 2023-07-10 PROCEDURE — 85025 CBC WITH DIFFERENTIAL: ICD-10-PCS | Mod: ,,, | Performed by: CLINICAL MEDICAL LABORATORY

## 2023-07-10 PROCEDURE — 1159F PR MEDICATION LIST DOCUMENTED IN MEDICAL RECORD: ICD-10-PCS | Mod: ,,, | Performed by: NURSE PRACTITIONER

## 2023-07-10 PROCEDURE — 3008F BODY MASS INDEX DOCD: CPT | Mod: ,,, | Performed by: NURSE PRACTITIONER

## 2023-07-10 PROCEDURE — 80061 LIPID PANEL: ICD-10-PCS | Mod: ,,, | Performed by: CLINICAL MEDICAL LABORATORY

## 2023-07-10 PROCEDURE — 3074F PR MOST RECENT SYSTOLIC BLOOD PRESSURE < 130 MM HG: ICD-10-PCS | Mod: ,,, | Performed by: NURSE PRACTITIONER

## 2023-07-10 PROCEDURE — 3078F PR MOST RECENT DIASTOLIC BLOOD PRESSURE < 80 MM HG: ICD-10-PCS | Mod: ,,, | Performed by: NURSE PRACTITIONER

## 2023-07-10 PROCEDURE — 80053 COMPREHENSIVE METABOLIC PANEL: ICD-10-PCS | Mod: ,,, | Performed by: CLINICAL MEDICAL LABORATORY

## 2023-07-10 PROCEDURE — 80053 COMPREHEN METABOLIC PANEL: CPT | Mod: ,,, | Performed by: CLINICAL MEDICAL LABORATORY

## 2023-07-10 PROCEDURE — 1160F RVW MEDS BY RX/DR IN RCRD: CPT | Mod: ,,, | Performed by: NURSE PRACTITIONER

## 2023-07-10 PROCEDURE — 1160F PR REVIEW ALL MEDS BY PRESCRIBER/CLIN PHARMACIST DOCUMENTED: ICD-10-PCS | Mod: ,,, | Performed by: NURSE PRACTITIONER

## 2023-07-10 PROCEDURE — 80061 LIPID PANEL: CPT | Mod: ,,, | Performed by: CLINICAL MEDICAL LABORATORY

## 2023-07-10 PROCEDURE — 1159F MED LIST DOCD IN RCRD: CPT | Mod: ,,, | Performed by: NURSE PRACTITIONER

## 2023-07-10 PROCEDURE — 99213 PR OFFICE/OUTPT VISIT, EST, LEVL III, 20-29 MIN: ICD-10-PCS | Mod: ,,, | Performed by: NURSE PRACTITIONER

## 2023-07-10 PROCEDURE — 85025 COMPLETE CBC W/AUTO DIFF WBC: CPT | Mod: ,,, | Performed by: CLINICAL MEDICAL LABORATORY

## 2023-07-10 PROCEDURE — 99213 OFFICE O/P EST LOW 20 MIN: CPT | Mod: ,,, | Performed by: NURSE PRACTITIONER

## 2023-07-10 PROCEDURE — 3074F SYST BP LT 130 MM HG: CPT | Mod: ,,, | Performed by: NURSE PRACTITIONER

## 2023-07-10 PROCEDURE — 3078F DIAST BP <80 MM HG: CPT | Mod: ,,, | Performed by: NURSE PRACTITIONER

## 2023-07-10 RX ORDER — HYDROCHLOROTHIAZIDE 25 MG/1
25 TABLET ORAL DAILY
Qty: 90 TABLET | Refills: 1 | Status: SHIPPED | OUTPATIENT
Start: 2023-07-10 | End: 2024-01-05 | Stop reason: SDUPTHER

## 2023-07-10 RX ORDER — AMLODIPINE BESYLATE 10 MG/1
10 TABLET ORAL DAILY
Qty: 90 TABLET | Refills: 1 | Status: SHIPPED | OUTPATIENT
Start: 2023-07-10 | End: 2024-01-05 | Stop reason: SDUPTHER

## 2023-07-10 RX ORDER — ROSUVASTATIN CALCIUM 10 MG/1
10 TABLET, COATED ORAL NIGHTLY
Qty: 90 TABLET | Refills: 1 | Status: SHIPPED | OUTPATIENT
Start: 2023-07-10 | End: 2024-01-05 | Stop reason: SDUPTHER

## 2023-07-10 NOTE — ASSESSMENT & PLAN NOTE
Lab Results   Component Value Date    CHOL 112 01/09/2023    CHOL 105 07/21/2022    CHOL 135 02/07/2022     Lab Results   Component Value Date    HDL 42 01/09/2023    HDL 38 (L) 07/21/2022    HDL 40 02/07/2022     Lab Results   Component Value Date    LDLCALC 50 01/09/2023    LDLCALC 45 07/21/2022    LDLCALC 68 02/07/2022     No results found for: DLDL  Lab Results   Component Value Date    TRIG 101 01/09/2023    TRIG 110 07/21/2022    TRIG 137 02/07/2022       f1   Lab Results   Component Value Date    CHOLHDL 2.7 01/09/2023    CHOLHDL 2.8 07/21/2022    CHOLHDL 3.4 02/07/2022     Cont home meds  Lab today   DASH diet

## 2023-07-10 NOTE — ASSESSMENT & PLAN NOTE
BP Readings from Last 3 Encounters:   07/10/23 121/79   06/26/23 102/70   05/10/23 129/77     Well controlled  Cont home meds   lab today

## 2023-07-10 NOTE — PROGRESS NOTES
DRISS Lowe   [facility] [address] [place] [address]  [phone]      PATIENT NAME: Kory Douglas  : 1955  DATE: 7/10/23  MRN: [id number]      Billing Provider: DRISS Lowe  Level of Service:   Patient PCP Information       Provider PCP Type    DRISS Lowe General            Reason for Visit / Chief Complaint: Hyperlipidemia, Hypertension, and Follow-up (6 mos)       Update PCP  Update Chief Complaint         History of Present Illness / Problem Focused Workflow     Kory Douglas presents to the clinic with Hyperlipidemia, Hypertension, and Follow-up (6 mos)     Pt presents for routine follow up with lab and med refills. Overall doing well.      BP appears  controlled today. Home meds reviewed  The current medical regimen is effective;  continue present plan and medications. Recommended patient to check home readings to monitor and see me for followup as scheduled or sooner as needed.   Discussed sodium restriction, maintaining ideal body weight and regular exercise program as physiologic means to continue to achieve blood pressure control in addition to medication compliance.  Patient was educated that both decongestant and anti-inflammatory medication may raise blood pressure.    Discussed need for low fat/low cholesterol diet, regular exercise, and weight control.   Cardiovascular risk and specific lipid/LDL goals reviewed.      Review of Systems     Review of Systems   Constitutional:  Negative for fatigue and fever.   HENT:  Negative for nasal congestion and sore throat.    Eyes:  Negative for visual disturbance.   Respiratory:  Negative for chest tightness and shortness of breath.    Cardiovascular:  Negative for chest pain and leg swelling.   Gastrointestinal:  Negative for abdominal pain, change in bowel habit and change in bowel habit.   Endocrine: Negative for polydipsia,  polyphagia and polyuria.   Genitourinary:  Negative for dysuria and hematuria.   Musculoskeletal:  Negative for back pain and leg pain.   Integumentary:  Negative for rash.   Neurological:  Negative for dizziness, syncope, weakness and light-headedness.      Medical / Social / Family History     Past Medical History:   Diagnosis Date    Cancer of prostate 12/16/2021     radical prostatectomy for Clari's 4+4 disease and tertiary pattern of Clari's 5 disease. The patient's tumor invades the bladder neck in the resection margins are positive areas. did not get XRT.    Erectile dysfunction after radical prostatectomy 12/16/2021    No meds tried to date Reviewed all methods of ED treatment    Hyperlipidemia     Hypertension     Personal history of colonic polyps 06/19/2019       Past Surgical History:   Procedure Laterality Date    ADENOIDECTOMY      COLONOSCOPY W/ BIOPSIES  06/19/2019    PROSTATE SURGERY         Social History  Mr. Douglas  reports that he has never smoked. He has never used smokeless tobacco. He reports that he does not drink alcohol and does not use drugs.    Family History  Mr. Douglas's family history includes No Known Problems in his father and mother.    Medications and Allergies     Medications  Outpatient Medications Marked as Taking for the 7/10/23 encounter (Office Visit) with DRISS Lowe   Medication Sig Dispense Refill    calcium carbonate (OS-SOL) 500 mg calcium (1,250 mg) tablet Take 1 tablet (500 mg total) by mouth once daily. 90 tablet 3       Allergies  Review of patient's allergies indicates:  No Known Allergies    Physical Examination     Vitals:    07/10/23 0830   BP: 121/79   Pulse: 67   Resp: 16   Temp: 97.4 °F (36.3 °C)     Physical Exam  Eyes:      Pupils: Pupils are equal, round, and reactive to light.   Cardiovascular:      Rate and Rhythm: Normal rate and regular rhythm.      Heart sounds: Normal heart sounds. No murmur heard.  Pulmonary:      Breath sounds: Normal  breath sounds. No wheezing, rhonchi or rales.   Abdominal:      General: Bowel sounds are normal.   Musculoskeletal:         General: No swelling.      Cervical back: Normal range of motion and neck supple.   Skin:     General: Skin is warm and dry.   Neurological:      Mental Status: He is alert and oriented to person, place, and time.        Lab Results   Component Value Date    WBC 7.48 07/21/2022    HGB 13.1 (L) 07/21/2022    HCT 41.5 07/21/2022    MCV 90.6 07/21/2022     07/21/2022        Sodium   Date Value Ref Range Status   01/09/2023 142 136 - 145 mmol/L Final     Potassium   Date Value Ref Range Status   01/09/2023 4.5 3.5 - 5.1 mmol/L Final     Chloride   Date Value Ref Range Status   01/09/2023 106 98 - 107 mmol/L Final     CO2   Date Value Ref Range Status   01/09/2023 32 21 - 32 mmol/L Final     Glucose   Date Value Ref Range Status   01/09/2023 97 74 - 106 mg/dL Final     BUN   Date Value Ref Range Status   01/09/2023 15 7 - 18 mg/dL Final     Creatinine   Date Value Ref Range Status   01/09/2023 0.94 0.70 - 1.30 mg/dL Final     Calcium   Date Value Ref Range Status   01/09/2023 9.0 8.5 - 10.1 mg/dL Final     Total Protein   Date Value Ref Range Status   01/09/2023 7.6 6.4 - 8.2 g/dL Final     Albumin   Date Value Ref Range Status   01/09/2023 4.2 3.5 - 5.0 g/dL Final     Bilirubin, Total   Date Value Ref Range Status   01/09/2023 0.7 >0.0 - 1.2 mg/dL Final     Alk Phos   Date Value Ref Range Status   01/09/2023 70 45 - 115 U/L Final     AST   Date Value Ref Range Status   01/09/2023 26 15 - 37 U/L Final     ALT   Date Value Ref Range Status   01/09/2023 36 16 - 61 U/L Final     Anion Gap   Date Value Ref Range Status   01/09/2023 9 7 - 16 mmol/L Final     eGFR   Date Value Ref Range Status   01/09/2023 88 >=60 mL/min/1.73m² Final      Lab Results   Component Value Date    HGBA1C 5.6 07/21/2022      Lab Results   Component Value Date    CHOL 112 01/09/2023    CHOL 105 07/21/2022    CHOL 135  02/07/2022     Lab Results   Component Value Date    HDL 42 01/09/2023    HDL 38 (L) 07/21/2022    HDL 40 02/07/2022     Lab Results   Component Value Date    LDLCALC 50 01/09/2023    LDLCALC 45 07/21/2022    LDLCALC 68 02/07/2022     No results found for: DLDL  Lab Results   Component Value Date    TRIG 101 01/09/2023    TRIG 110 07/21/2022    TRIG 137 02/07/2022     Lab Results   Component Value Date    CHOLHDL 2.7 01/09/2023    CHOLHDL 2.8 07/21/2022    CHOLHDL 3.4 02/07/2022      Lab Results   Component Value Date    TSH 0.793 02/07/2022        Assessment and Plan (including Health Maintenance)      Problem List  Smart Sets  Document Outside HM   :    Plan:     1. Essential hypertension  Assessment & Plan:  BP Readings from Last 3 Encounters:   07/10/23 121/79   06/26/23 102/70   05/10/23 129/77     Well controlled  Cont home meds   lab today     Orders:  -     CBC Auto Differential; Future; Expected date: 07/10/2023  -     Comprehensive Metabolic Panel; Future; Expected date: 07/10/2023  -     amLODIPine (NORVASC) 10 MG tablet; Take 1 tablet (10 mg total) by mouth once daily.  Dispense: 90 tablet; Refill: 1  -     hydroCHLOROthiazide (HYDRODIURIL) 25 MG tablet; Take 1 tablet (25 mg total) by mouth once daily.  Dispense: 90 tablet; Refill: 1    2. Hyperlipidemia, unspecified hyperlipidemia type  Assessment & Plan:  Lab Results   Component Value Date    CHOL 112 01/09/2023    CHOL 105 07/21/2022    CHOL 135 02/07/2022     Lab Results   Component Value Date    HDL 42 01/09/2023    HDL 38 (L) 07/21/2022    HDL 40 02/07/2022     Lab Results   Component Value Date    LDLCALC 50 01/09/2023    LDLCALC 45 07/21/2022    LDLCALC 68 02/07/2022     No results found for: DLDL  Lab Results   Component Value Date    TRIG 101 01/09/2023    TRIG 110 07/21/2022    TRIG 137 02/07/2022       f1   Lab Results   Component Value Date    CHOLHDL 2.7 01/09/2023    CHOLHDL 2.8 07/21/2022    CHOLHDL 3.4 02/07/2022     Cont home meds  Lab  today   DASH diet     Orders:  -     Lipid Panel; Future; Expected date: 07/10/2023  -     rosuvastatin (CRESTOR) 10 MG tablet; Take 1 tablet (10 mg total) by mouth every evening.  Dispense: 90 tablet; Refill: 1    3. Essential hypertension  Comments:  no meds this am  goal 130/80  Assessment & Plan:  BP Readings from Last 3 Encounters:   07/10/23 121/79   06/26/23 102/70   05/10/23 129/77     Well controlled  Cont home meds   lab today     Orders:  -     CBC Auto Differential; Future; Expected date: 07/10/2023  -     Comprehensive Metabolic Panel; Future; Expected date: 07/10/2023  -     amLODIPine (NORVASC) 10 MG tablet; Take 1 tablet (10 mg total) by mouth once daily.  Dispense: 90 tablet; Refill: 1  -     hydroCHLOROthiazide (HYDRODIURIL) 25 MG tablet; Take 1 tablet (25 mg total) by mouth once daily.  Dispense: 90 tablet; Refill: 1         There are no Patient Instructions on file for this visit.     Health Maintenance Due   Topic Date Due    Shingles Vaccine (1 of 2) 05/20/2023         Health Maintenance Topics with due status: Not Due       Topic Last Completion Date    Colorectal Cancer Screening 06/19/2019    Hemoglobin A1c (Prediabetes) 07/21/2022    Influenza Vaccine 10/06/2022    Lipid Panel 01/09/2023    TETANUS VACCINE 03/25/2023    PROSTATE-SPECIFIC ANTIGEN 06/26/2023       Future Appointments   Date Time Provider Department Center   12/26/2023  8:00 AM Rico Garcia NP [facility]   1/8/2024  8:20 AM DRISS Lowe [facility] [place]   5/14/2024 11:00 AM AWV NURSE, [facility] KAYMED Link [place]            Signature:  DRISS Lowe  [facility] [address] [place] [address]  [phone]    Date of encounter: 7/10/23

## 2024-01-08 ENCOUNTER — OFFICE VISIT (OUTPATIENT)
Dept: FAMILY MEDICINE | Facility: CLINIC | Age: 69
End: 2024-01-08
Payer: COMMERCIAL

## 2024-01-08 VITALS
RESPIRATION RATE: 16 BRPM | SYSTOLIC BLOOD PRESSURE: 135 MMHG | OXYGEN SATURATION: 100 % | DIASTOLIC BLOOD PRESSURE: 74 MMHG | TEMPERATURE: 98 F | WEIGHT: 193 LBS | BODY MASS INDEX: 28.58 KG/M2 | HEART RATE: 71 BPM | HEIGHT: 69 IN

## 2024-01-08 DIAGNOSIS — C61 CANCER OF PROSTATE: ICD-10-CM

## 2024-01-08 DIAGNOSIS — Z13.1 SCREENING FOR DIABETES MELLITUS: ICD-10-CM

## 2024-01-08 DIAGNOSIS — I10 ESSENTIAL HYPERTENSION: Primary | ICD-10-CM

## 2024-01-08 DIAGNOSIS — E78.5 HYPERLIPIDEMIA, UNSPECIFIED HYPERLIPIDEMIA TYPE: ICD-10-CM

## 2024-01-08 LAB
ALBUMIN SERPL BCP-MCNC: 3.9 G/DL (ref 3.5–5)
ALBUMIN/GLOB SERPL: 1.2 {RATIO}
ALP SERPL-CCNC: 72 U/L (ref 45–115)
ALT SERPL W P-5'-P-CCNC: 26 U/L (ref 16–61)
ANION GAP SERPL CALCULATED.3IONS-SCNC: 8 MMOL/L (ref 7–16)
AST SERPL W P-5'-P-CCNC: 15 U/L (ref 15–37)
BILIRUB SERPL-MCNC: 0.8 MG/DL (ref ?–1.2)
BUN SERPL-MCNC: 16 MG/DL (ref 7–18)
BUN/CREAT SERPL: 17 (ref 6–20)
CALCIUM SERPL-MCNC: 8.7 MG/DL (ref 8.5–10.1)
CHLORIDE SERPL-SCNC: 107 MMOL/L (ref 98–107)
CHOLEST SERPL-MCNC: 151 MG/DL (ref 0–200)
CHOLEST/HDLC SERPL: 3.9 {RATIO}
CO2 SERPL-SCNC: 29 MMOL/L (ref 21–32)
CREAT SERPL-MCNC: 0.96 MG/DL (ref 0.7–1.3)
EGFR (NO RACE VARIABLE) (RUSH/TITUS): 86 ML/MIN/1.73M2
EST. AVERAGE GLUCOSE BLD GHB EST-MCNC: 108 MG/DL
GLOBULIN SER-MCNC: 3.2 G/DL (ref 2–4)
GLUCOSE SERPL-MCNC: 97 MG/DL (ref 74–106)
HBA1C MFR BLD HPLC: 5.4 % (ref 4.5–6.6)
HDLC SERPL-MCNC: 39 MG/DL (ref 40–60)
LDLC SERPL CALC-MCNC: 92 MG/DL
LDLC/HDLC SERPL: 2.4 {RATIO}
NONHDLC SERPL-MCNC: 112 MG/DL
POTASSIUM SERPL-SCNC: 3.9 MMOL/L (ref 3.5–5.1)
PROT SERPL-MCNC: 7.1 G/DL (ref 6.4–8.2)
SODIUM SERPL-SCNC: 140 MMOL/L (ref 136–145)
TRIGL SERPL-MCNC: 99 MG/DL (ref 35–150)
VLDLC SERPL-MCNC: 20 MG/DL

## 2024-01-08 PROCEDURE — 1160F RVW MEDS BY RX/DR IN RCRD: CPT | Mod: ,,, | Performed by: NURSE PRACTITIONER

## 2024-01-08 PROCEDURE — 82570 ASSAY OF URINE CREATININE: CPT | Mod: ,,, | Performed by: CLINICAL MEDICAL LABORATORY

## 2024-01-08 PROCEDURE — 3075F SYST BP GE 130 - 139MM HG: CPT | Mod: ,,, | Performed by: NURSE PRACTITIONER

## 2024-01-08 PROCEDURE — 3078F DIAST BP <80 MM HG: CPT | Mod: ,,, | Performed by: NURSE PRACTITIONER

## 2024-01-08 PROCEDURE — 99213 OFFICE O/P EST LOW 20 MIN: CPT | Mod: ,,, | Performed by: NURSE PRACTITIONER

## 2024-01-08 PROCEDURE — 80061 LIPID PANEL: CPT | Mod: ,,, | Performed by: CLINICAL MEDICAL LABORATORY

## 2024-01-08 PROCEDURE — 1159F MED LIST DOCD IN RCRD: CPT | Mod: ,,, | Performed by: NURSE PRACTITIONER

## 2024-01-08 PROCEDURE — 82043 UR ALBUMIN QUANTITATIVE: CPT | Mod: ,,, | Performed by: CLINICAL MEDICAL LABORATORY

## 2024-01-08 PROCEDURE — 3008F BODY MASS INDEX DOCD: CPT | Mod: ,,, | Performed by: NURSE PRACTITIONER

## 2024-01-08 PROCEDURE — 80053 COMPREHEN METABOLIC PANEL: CPT | Mod: ,,, | Performed by: CLINICAL MEDICAL LABORATORY

## 2024-01-08 PROCEDURE — 83036 HEMOGLOBIN GLYCOSYLATED A1C: CPT | Mod: GZ,,, | Performed by: CLINICAL MEDICAL LABORATORY

## 2024-01-08 RX ORDER — HYDROCHLOROTHIAZIDE 25 MG/1
25 TABLET ORAL DAILY
Qty: 90 TABLET | Refills: 1 | Status: SHIPPED | OUTPATIENT
Start: 2024-01-08

## 2024-01-08 RX ORDER — CALCIUM CARBONATE 500(1250)
1 TABLET ORAL DAILY
Qty: 90 TABLET | Refills: 3 | Status: SHIPPED | OUTPATIENT
Start: 2024-01-08 | End: 2025-01-07

## 2024-01-08 RX ORDER — ROSUVASTATIN CALCIUM 10 MG/1
10 TABLET, COATED ORAL NIGHTLY
Qty: 90 EACH | Refills: 1 | Status: SHIPPED | OUTPATIENT
Start: 2024-01-08

## 2024-01-08 RX ORDER — AMLODIPINE BESYLATE 10 MG/1
10 TABLET ORAL DAILY
Qty: 90 EACH | Refills: 1 | Status: SHIPPED | OUTPATIENT
Start: 2024-01-08

## 2024-01-08 NOTE — ASSESSMENT & PLAN NOTE
BP Readings from Last 3 Encounters:   01/08/24 135/74   07/10/23 121/79   06/26/23 102/70    The current medical regimen is effective;  continue present plan and medications.

## 2024-01-08 NOTE — ASSESSMENT & PLAN NOTE
Lab Results   Component Value Date    CHOL 105 07/10/2023    CHOL 112 01/09/2023    CHOL 105 07/21/2022     Lab Results   Component Value Date    HDL 40 07/10/2023    HDL 42 01/09/2023    HDL 38 (L) 07/21/2022     Lab Results   Component Value Date    LDLCALC 49 07/10/2023    LDLCALC 50 01/09/2023    LDLCALC 45 07/21/2022     Lab Results   Component Value Date    TRIG 81 07/10/2023    TRIG 101 01/09/2023    TRIG 110 07/21/2022       Lab Results   Component Value Date    CHOLHDL 2.6 07/10/2023    CHOLHDL 2.7 01/09/2023    CHOLHDL 2.8 07/21/2022    The current medical regimen is effective;  continue present plan and medications.

## 2024-01-08 NOTE — PROGRESS NOTES
DRISS Lowe   [facility] [address] [place] [address]  [phone]      PATIENT NAME: Kory Douglas  : 1955  DATE: 24  MRN: [id number]      Billing Provider: DRISS Lowe  Level of Service:   Patient PCP Information       Provider PCP Type    DRISS Lowe General            Reason for Visit / Chief Complaint: Hypertension, Hyperlipidemia, and Follow-up (6 mos)       Update PCP  Update Chief Complaint         History of Present Illness / Problem Focused Workflow     Kory Douglas presents to the clinic with Hypertension, Hyperlipidemia, and Follow-up (6 mos)     Pt presents for routine follow up with lab and med refills. Overall doing well.      BP appears  controlled today. Home meds reviewed  The current medical regimen is effective;  continue present plan and medications. Recommended patient to check home readings to monitor and see me for followup as scheduled or sooner as needed.   Discussed sodium restriction, maintaining ideal body weight and regular exercise program as physiologic means to continue to achieve blood pressure control in addition to medication compliance.  Patient was educated that both decongestant and anti-inflammatory medication may raise blood pressure.  Advised to monitor BP at home. Advised on optimal BP readings - SBP < 130 & DBP < 80. Advised to call office for any persistent BP elevation and may have to prescribe or adjust BP med(s).  Recommended DASH diet, stay well hydrated with water daily, eliminate or decrease caffeinated and high calorie drinks, increase physical activity, and lose weight if BMI > 25.0.    Discussed need for low fat/low cholesterol diet, regular exercise, and weight control.   Cardiovascular risk and specific lipid/LDL goals reviewed.        Review of Systems     Review of Systems   Constitutional:  Negative for fatigue and  fever.   HENT:  Negative for nasal congestion and sore throat.    Eyes:  Negative for visual disturbance.   Respiratory:  Negative for chest tightness and shortness of breath.    Cardiovascular:  Negative for chest pain and leg swelling.   Gastrointestinal:  Negative for abdominal pain and change in bowel habit.   Endocrine: Negative for polydipsia, polyphagia and polyuria.   Genitourinary:  Negative for dysuria and hematuria.   Musculoskeletal:  Negative for back pain and leg pain.   Integumentary:  Negative for rash.   Neurological:  Negative for dizziness, syncope, weakness and light-headedness.        Medical / Social / Family History     Past Medical History:   Diagnosis Date    Cancer of prostate 12/16/2021     radical prostatectomy for Clari's 4+4 disease and tertiary pattern of [place]'s 5 disease. The patient's tumor invades the bladder neck in the resection margins are positive areas. did not get XRT.    Erectile dysfunction after radical prostatectomy 12/16/2021    No meds tried to date Reviewed all methods of ED treatment    Hyperlipidemia     Hypertension     Personal history of colonic polyps 06/19/2019       Past Surgical History:   Procedure Laterality Date    ADENOIDECTOMY      COLONOSCOPY W/ BIOPSIES  06/19/2019    PROSTATE SURGERY         Social History  Mr. Douglas  reports that he has never smoked. He has never used smokeless tobacco. He reports that he does not drink alcohol and does not use drugs.    Family History  Mr. Douglas's family history includes No Known Problems in his father and mother.    Medications and Allergies     Medications  Outpatient Medications Marked as Taking for the 1/8/24 encounter (Office Visit) with Evelina Gongora ACNP   Medication Sig Dispense Refill    amLODIPine (NORVASC) 10 MG tablet Take 1 tablet (10 mg total) by mouth once daily. 90 each 1    calcium carbonate (OS-SOL) 500 mg calcium (1,250 mg) tablet Take 1 tablet (500 mg total) by mouth once daily. 90 tablet  3    hydroCHLOROthiazide (HYDRODIURIL) 25 MG tablet Take 1 tablet (25 mg total) by mouth once daily. 90 tablet 1    rosuvastatin (CRESTOR) 10 MG tablet Take 1 tablet (10 mg total) by mouth every evening. 90 each 1       Allergies  Review of patient's allergies indicates:  No Known Allergies    Physical Examination     Vitals:    01/08/24 0826   BP: 135/74   Pulse: 71   Resp: 16   Temp: 97.7 °F (36.5 °C)     Physical Exam  Eyes:      Pupils: Pupils are equal, round, and reactive to light.   Cardiovascular:      Rate and Rhythm: Normal rate and regular rhythm.      Heart sounds: Normal heart sounds. No murmur heard.  Pulmonary:      Breath sounds: Normal breath sounds. No wheezing, rhonchi or rales.   Abdominal:      General: Bowel sounds are normal.   Musculoskeletal:         General: No swelling.      Cervical back: Normal range of motion and neck supple.   Skin:     General: Skin is warm and dry.   Neurological:      Mental Status: He is alert and oriented to person, place, and time.          Lab Results   Component Value Date    WBC 7.97 07/10/2023    HGB 13.1 (L) 07/10/2023    HCT 43.1 07/10/2023    MCV 90.9 07/10/2023     07/10/2023        Sodium   Date Value Ref Range Status   07/10/2023 140 136 - 145 mmol/L Final     Potassium   Date Value Ref Range Status   07/10/2023 4.0 3.5 - 5.1 mmol/L Final     Chloride   Date Value Ref Range Status   07/10/2023 110 (H) 98 - 107 mmol/L Final     CO2   Date Value Ref Range Status   07/10/2023 27 21 - 32 mmol/L Final     Glucose   Date Value Ref Range Status   07/10/2023 90 74 - 106 mg/dL Final     BUN   Date Value Ref Range Status   07/10/2023 12 7 - 18 mg/dL Final     Creatinine   Date Value Ref Range Status   07/10/2023 0.89 0.70 - 1.30 mg/dL Final     Calcium   Date Value Ref Range Status   07/10/2023 8.3 (L) 8.5 - 10.1 mg/dL Final     Total Protein   Date Value Ref Range Status   07/10/2023 7.3 6.4 - 8.2 g/dL Final     Albumin   Date Value Ref Range Status  "  07/10/2023 3.8 3.5 - 5.0 g/dL Final     Bilirubin, Total   Date Value Ref Range Status   07/10/2023 0.7 >0.0 - 1.2 mg/dL Final     Alk Phos   Date Value Ref Range Status   07/10/2023 63 45 - 115 U/L Final     AST   Date Value Ref Range Status   07/10/2023 21 15 - 37 U/L Final     ALT   Date Value Ref Range Status   07/10/2023 32 16 - 61 U/L Final     Anion Gap   Date Value Ref Range Status   07/10/2023 7 7 - 16 mmol/L Final     eGFR   Date Value Ref Range Status   07/10/2023 93 >=60 mL/min/1.73m2 Final      Lab Results   Component Value Date    HGBA1C 5.6 07/21/2022      Lab Results   Component Value Date    CHOL 105 07/10/2023    CHOL 112 01/09/2023    CHOL 105 07/21/2022     Lab Results   Component Value Date    HDL 40 07/10/2023    HDL 42 01/09/2023    HDL 38 (L) 07/21/2022     Lab Results   Component Value Date    LDLCALC 49 07/10/2023    LDLCALC 50 01/09/2023    LDLCALC 45 07/21/2022     No results found for: "DLDL"  Lab Results   Component Value Date    TRIG 81 07/10/2023    TRIG 101 01/09/2023    TRIG 110 07/21/2022     Lab Results   Component Value Date    CHOLHDL 2.6 07/10/2023    CHOLHDL 2.7 01/09/2023    CHOLHDL 2.8 07/21/2022      Lab Results   Component Value Date    TSH 0.793 02/07/2022        Assessment and Plan (including Health Maintenance)      Problem List  Smart Sets  Document Outside HM   :    Plan:     1. Essential hypertension  Assessment & Plan:  BP Readings from Last 3 Encounters:   01/08/24 135/74   07/10/23 121/79   06/26/23 102/70    The current medical regimen is effective;  continue present plan and medications.      Orders:  -     Comprehensive Metabolic Panel; Future; Expected date: 01/08/2024  -     Microalbumin/Creatinine Ratio, Urine; Future; Expected date: 01/08/2024  -     amLODIPine (NORVASC) 10 MG tablet; Take 1 tablet (10 mg total) by mouth once daily.  Dispense: 90 each; Refill: 1  -     hydroCHLOROthiazide (HYDRODIURIL) 25 MG tablet; Take 1 tablet (25 mg total) by mouth " once daily.  Dispense: 90 tablet; Refill: 1    2. Hyperlipidemia, unspecified hyperlipidemia type  Assessment & Plan:  Lab Results   Component Value Date    CHOL 105 07/10/2023    CHOL 112 01/09/2023    CHOL 105 07/21/2022     Lab Results   Component Value Date    HDL 40 07/10/2023    HDL 42 01/09/2023    HDL 38 (L) 07/21/2022     Lab Results   Component Value Date    LDLCALC 49 07/10/2023    LDLCALC 50 01/09/2023    LDLCALC 45 07/21/2022     Lab Results   Component Value Date    TRIG 81 07/10/2023    TRIG 101 01/09/2023    TRIG 110 07/21/2022       Lab Results   Component Value Date    CHOLHDL 2.6 07/10/2023    CHOLHDL 2.7 01/09/2023    CHOLHDL 2.8 07/21/2022    The current medical regimen is effective;  continue present plan and medications.      Orders:  -     Lipid Panel; Future; Expected date: 01/08/2024  -     rosuvastatin (CRESTOR) 10 MG tablet; Take 1 tablet (10 mg total) by mouth every evening.  Dispense: 90 each; Refill: 1    3. Screening for diabetes mellitus  -     Hemoglobin A1C; Future; Expected date: 01/08/2024    4. Cancer of prostate  Overview:   radical prostatectomy for [place]'s 4+4 disease and tertiary pattern of Clari's 5 disease. The patient's tumor invades the bladder neck in the resection margins are positive areas.  did not get XRT.    Orders:  -     calcium carbonate (OS-SOL) 500 mg calcium (1,250 mg) tablet; Take 1 tablet (500 mg total) by mouth once daily.  Dispense: 90 tablet; Refill: 3         There are no Patient Instructions on file for this visit.     Health Maintenance Due   Topic Date Due    Diabetes Urine Screening  02/07/2023    Hemoglobin A1c  07/21/2023    Influenza Vaccine (1) 09/01/2023         Health Maintenance Topics with due status: Not Due       Topic Last Completion Date    Colorectal Cancer Screening 06/19/2019    TETANUS VACCINE 03/25/2023    PROSTATE-SPECIFIC ANTIGEN 06/26/2023    Lipid Panel 07/10/2023       Future Appointments   Date Time Provider Department  [place]   1/9/2024 10:00 AM Rico Garcia, NP [facility]   5/14/2024 11:00 AM AWKRYSTEN NURSE, [facility] SOP[facility] [place]   7/8/2024  8:20 AM Evelina Gongora ACNP [facility] S[facility] [place]            Signature:  DRISS Lowe [facility] [address] [place] [address]  [phone]    Date of encounter: 1/8/24

## 2024-01-09 DIAGNOSIS — Z71.89 COMPLEX CARE COORDINATION: ICD-10-CM

## 2024-01-09 LAB
CREAT UR-MCNC: 190 MG/DL (ref 39–259)
MICROALBUMIN UR-MCNC: 0.9 MG/DL (ref 0–2.8)
MICROALBUMIN/CREAT RATIO PNL UR: 4.7 MG/G (ref 0–30)

## 2024-01-19 NOTE — PROGRESS NOTES
Subjective     Patient ID: Kory Douglas is a 68 y.o. male.    Chief Complaint: No chief complaint on file.    UROLOGICAL HISTORY-  MARITZA  psa today. Undetectable.  Doing well w/o complaints.     Previous notes below:     Patient presents for 3 month f/u with PSA prior. His PSA on 02/01/2021 <0.010 undetectable. His numbers have been undetectable. Had surgery January 1, 2019. He got 3 shots every 6 months (18 months of ADT). Had hot flashes with it. Recommend getting back on shots if numbers become detectable and then will refer back to rad/onc.      pathology below     Patient is post radicle 2019. His PSA is 0.010ng/ml on 1/20/2020. 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 The patient's tumor invades the bladder neck in the resection margins are positive areas.     did not get XRT, did get at least 1 ADT dose.      Plan:  3 months with PSA   If PSA is undectable next visit will go to every 6 months.   *no medication changes today.      Last note below:  Patient presents for 3 month f/u with PSA. His PSA on 10/30/2020 <0.010 undetectable. Discussed had aggressive prostate cancer at high risk for recurrence . Recommend continuing to check PSA every 3 months and if gets above 0.2 will need a scan.   Had one dose of hormone therapy-off of for over 6 months. Not taking Casodex either. Reports his hot flashes are better.      Assessment:  ED  CAP  Hx of elevated PSA     Plan:  F/u in 3 months with PSA prior. .         Last note below:  07/29/2020  xxxxxxxxxxxxxxxxxxxxxxxxxxxxxxxxxxxxxxxxxxxxxxxxxxxxxxxxxxxxxxxxxxxx     Mr. Douglas is a previous patient of Irena Seo and Conner, who is here today to establish continued surveillance of his CAP, GS 4+4, s/p surgery and 18 months of ADT.     Patient here for 6 month f/u. Former patient of Dr. Prieto. Voices no complaints.       -follow up 3 months, if PSA > 0.20  then PET Axium scan. if + then XRT recommendations for salvage. holding off on ADT at this time.      bad hot flashes. patient was not on vitd/ calcium.      psa is undetectable today.   [12/16/2021]------------------------------------------------------------------------        Mr. Douglas has returned for 6 month f/u for surveillance of CAP, GS 8, s/p surgery 1/2019.  History of 18 mos of ADT.   Tumor invades the bladder neck in the resection margins are positive areas.Denies bleeding, wt loss, bone pain, dysuria, or difficulty emptying bladder.  PVR 18.  Recent Bone Scan performed:  Increased update of the lower thoracic and lumbar spine than seen on previous study seen.F/u with PCP for this imaging and work up.  F/u with PCP for plain films of thoracic and lumbar spine to exclude sclerotic  mass.     Lab Results       Component                Value               Date                       CREATININE               0.96                02/07/2022        [6/21/2022]--------------------------------------------------------------------------     Mr. Douglas is returning today for 6 month f/u of CAP, GS 8 s/p prostatectomy 1/2019.  18 mos of ADT .  PSA remains immeasurable.  No XRT to date and states he is feeling well.  He denies incontinence.  Denies urinary complaints.        -  Cancer of prostate              PSA remains immeasurable at <0.010              No further treatment indicated at this time.              Continue surveillance q 6 months with PSA              Recommend restarting ADT if numbers become detectable and then will refer back to rad/onc.              Continues to take vitamin D and C              Bone Scan done:  Showed Increased update of the lower thoracic and lumbar spine than seen on previous study seen.  patient declines imaging here today before leaving, needs F/u with PCP for plain films of thoracic and lumbar spine to exclude sclerotic  mass.  He elects to F/u with PCP for this, appt on the  9th.     -  Erectile dysfunction after radical prostatectomy              Reports controlled, continue sildenafil as prescribed.  [1/3/2023]--------------------------------------------------------------------------  ------------------------------------------------------------------------------------------------------------------------------------------------------------------------------------------------------------------------------------------------  The above notes are from TUCKER Morrell in the EMR system.      This pleasant 68 year old male presents to the clinic for follow up of Prostate Cancer and ED s/p radical prostatectomy. Patient states he is doing good and denies any new Urological complaints. He is pleased with the way he is voiding and his ED is controlled on Viagra. He desires to continue the current management. His PVR today is 15 mls and his IPPS score is 2 for nocturia 2 times a night. He is not on any medications to help is voiding. He reports having a radical prostatectomy in 2019 with a [place] score of 4+4=8. Records indicates Tumor invades the bladder neck in the resection margins are positive areas. Records also indicate 18 months of ADT and No XRT to date. Records indicate Bone Scan was done that Showed Increased uptake of the lower thoracic and lumbar spine than seen on previous study seen. He declined imaging at that time and chose to follow up with PCP for plain films of thoracic and lumbar spine to exclude sclerotic  mass.  He denies any hematuria, dysuria, nausea, vomiting, fever, or chills. He denies any incontinence. He desires to continue the Viagra for now to manage his ED. We discussed the penile pump and he will consider this if needed. He did not get his PSA before today's visit and will get this on the way out. We will call him his results and repeat the PSA in six months. I discussed the plan in detail with the patient and he is in agreement with the plan. All his questions were  answered at today's visit.      NM BONE SCAN WHOLE BODY done on June 17, 2022 showed COMPARISON: 11/28/2017   FINDINGS:  There is mild symmetric increased radiotracer uptake at both shoulders, both SI joints, both knees and both feet in a pattern consistent with arthritis.  There is mildly irregular mild to moderate uptake of the lower cervical spine, lower thoracic spine, and lower lumbar spine.  Cervical changes are similar to the previous.  Thoracic and lumbar findings are more prominent than the previous.      Impression:  Likely arthritic changes along the spinal column, but more focal activity in the lower thoracic and lower lumbar spine than seen on the previous study.  Plain film of the thoracic and lumbar spine recommended to exclude sclerotic mass. (Patient chose to follow up with PCP for plain films).      PSA history:   PSA on 12-16-22 was   <0.010  PSA on 07-21-22 was   <0.010   PSA on 06-17-22 was   <0.010  PSA on 12-10-21 was   <0.010   PSA on 05-10-21 was   <0.010  PSA on 02-01-21 was   <0.010  PSA on 10-30-20 was   <0.010   PSA on 01-20-20 was   <0.010 -------------------------------------------------------------------------------------------------------------------------------------------------------------------------------[June 26, 2023].                This pleasant 68 year old male presents to the clinic for follow up of Prostate Cancer and ED s/p radical prostatectomy. Patient states he is still doing good.  He denies any new Urological complaints. He is pleased with the way he is voiding and desires no intervention for the ED.  He desires to continue the current management. He was previously on Viagra but stopped this medication due to lack of benefit. We discussed the penile vacuum device versus penile implant but he desires again no further intervention for the ED.  His PVR today is 000 mls and his IPPS score is 2 for nocturia 2 times a night. He is not on any medications to help him void.  He reports having a radical prostatectomy in 2019 with a Brimley score of 4+4=8. Records indicates Tumor invades the bladder neck in the resection margins are positive areas. Records also indicate 18 months of ADT and No XRT to date. His Bone Scan done in June 2022  Showed Increased uptake of the lower thoracic and lumbar spine than seen on previous study seen. He declined imaging at that time and chose to follow up with PCP for plain films of thoracic and lumbar spine to exclude sclerotic  mass. He reports the imaging was done and was told it was normal.  He denies any hematuria, dysuria, incomplete bladder emptying, frequency, urgency, intermittency, weak stream or straining.  He denies any nausea, vomiting, fever, or chills. He denies any bladder, back or abdominal pain. He denies any incontinence. We will repeat the PSA in six months. I discussed the plan in detail with the patient and he is in agreement with the plan. All his questions were answered at today's visit. I spent 20 minutes counseling this patient.     PSA history:   PSA on 01/24/24 was   <0.010  PSA on 06-26-23 was   <0.010  PSA on 12-16-22 was   <0.010  PSA on 07-21-22 was   <0.010   PSA on 06-17-22 was   <0.010  PSA on 12-10-21 was   <0.010   PSA on 05-10-21 was   <0.010  PSA on 02-01-21 was   <0.010  PSA on 10-30-20 was   <0.010   PSA on 01-20-20 was   <0.010   ----------------------------------------------------------------------------------------------------------------------------------------------------------------  [January 24, 2024].       Review of Systems   Constitutional:  Negative for activity change and fever.   HENT:  Negative for hearing loss and trouble swallowing.    Eyes:  Negative for visual disturbance.   Respiratory:  Negative for cough, shortness of breath and wheezing.    Cardiovascular:  Negative for chest pain.   Gastrointestinal:  Negative for abdominal pain, diarrhea, nausea and vomiting.   Endocrine: Negative for  polyuria.   Genitourinary:  Positive for erectile dysfunction. Negative for bladder incontinence, decreased urine volume, difficulty urinating, discharge, dysuria, enuresis, flank pain, frequency, genital sores, hematuria, penile pain, penile swelling, scrotal swelling, testicular pain and urgency.        Prostate Cancer    Musculoskeletal:  Negative for back pain and gait problem.   Integumentary:  Negative for rash.   Neurological:  Negative for speech difficulty and weakness.   Psychiatric/Behavioral:  Negative for behavioral problems and confusion.           Objective     Physical Exam  Vitals and nursing note reviewed.   Constitutional:       General: He is not in acute distress.     Appearance: Normal appearance. He is not ill-appearing, toxic-appearing or diaphoretic.   HENT:      Head: Normocephalic.   Eyes:      Extraocular Movements: Extraocular movements intact.   Cardiovascular:      Rate and Rhythm: Normal rate and regular rhythm.      Heart sounds: Normal heart sounds.   Pulmonary:      Effort: Pulmonary effort is normal. No respiratory distress.      Breath sounds: Normal breath sounds. No wheezing, rhonchi or rales.   Abdominal:      General: Bowel sounds are normal.      Palpations: Abdomen is soft.      Tenderness: There is no abdominal tenderness. There is no right CVA tenderness, left CVA tenderness, guarding or rebound.   Musculoskeletal:         General: Normal range of motion.      Cervical back: Normal range of motion. No rigidity.   Skin:     General: Skin is warm and dry.   Neurological:      General: No focal deficit present.      Mental Status: He is alert and oriented to person, place, and time.      Motor: No weakness.      Coordination: Coordination normal.      Gait: Gait normal.   Psychiatric:         Mood and Affect: Mood normal.         Behavior: Behavior normal.         Thought Content: Thought content normal.        Assessment and Plan     Problem List Items Addressed This Visit           Renal/    Erectile dysfunction after radical prostatectomy (Chronic)       Oncology    Cancer of prostate - Primary    Relevant Orders    PSA, Total (Diagnostic)        PSA in 6 months   Follow up with Urology TUCKER Correia in June 2024

## 2024-01-23 ENCOUNTER — TELEPHONE (OUTPATIENT)
Dept: UROLOGY | Facility: CLINIC | Age: 69
End: 2024-01-23
Payer: COMMERCIAL

## 2024-01-23 NOTE — TELEPHONE ENCOUNTER
Received a message from  that pt called and wants order in for his PSA.  I checked chart and order has been in since 2023 and does not  until 24, so it is still good.  I called pt back and told him this.  He voiced understanding and said he will try to come early in am and get drawn and see NP at 2:30pm.

## 2024-01-24 ENCOUNTER — OFFICE VISIT (OUTPATIENT)
Dept: UROLOGY | Facility: CLINIC | Age: 69
End: 2024-01-24
Payer: COMMERCIAL

## 2024-01-24 VITALS
BODY MASS INDEX: 28.58 KG/M2 | OXYGEN SATURATION: 98 % | HEART RATE: 82 BPM | SYSTOLIC BLOOD PRESSURE: 130 MMHG | DIASTOLIC BLOOD PRESSURE: 70 MMHG | WEIGHT: 193 LBS | RESPIRATION RATE: 18 BRPM | HEIGHT: 69 IN | TEMPERATURE: 98 F

## 2024-01-24 DIAGNOSIS — N52.31 ERECTILE DYSFUNCTION AFTER RADICAL PROSTATECTOMY: Chronic | ICD-10-CM

## 2024-01-24 DIAGNOSIS — C61 CANCER OF PROSTATE: Primary | ICD-10-CM

## 2024-01-24 PROCEDURE — 99213 OFFICE O/P EST LOW 20 MIN: CPT | Mod: S$PBB,,, | Performed by: NURSE PRACTITIONER

## 2024-01-24 PROCEDURE — 99215 OFFICE O/P EST HI 40 MIN: CPT | Mod: PBBFAC | Performed by: NURSE PRACTITIONER

## 2024-01-24 PROCEDURE — 3061F NEG MICROALBUMINURIA REV: CPT | Mod: CPTII,,, | Performed by: NURSE PRACTITIONER

## 2024-01-24 PROCEDURE — 1159F MED LIST DOCD IN RCRD: CPT | Mod: CPTII,,, | Performed by: NURSE PRACTITIONER

## 2024-01-24 PROCEDURE — 1101F PT FALLS ASSESS-DOCD LE1/YR: CPT | Mod: CPTII,,, | Performed by: NURSE PRACTITIONER

## 2024-01-24 PROCEDURE — 3066F NEPHROPATHY DOC TX: CPT | Mod: CPTII,,, | Performed by: NURSE PRACTITIONER

## 2024-01-24 PROCEDURE — 3288F FALL RISK ASSESSMENT DOCD: CPT | Mod: CPTII,,, | Performed by: NURSE PRACTITIONER

## 2024-01-24 PROCEDURE — 1126F AMNT PAIN NOTED NONE PRSNT: CPT | Mod: CPTII,,, | Performed by: NURSE PRACTITIONER

## 2024-01-24 PROCEDURE — 3078F DIAST BP <80 MM HG: CPT | Mod: CPTII,,, | Performed by: NURSE PRACTITIONER

## 2024-01-24 PROCEDURE — 3008F BODY MASS INDEX DOCD: CPT | Mod: CPTII,,, | Performed by: NURSE PRACTITIONER

## 2024-01-24 PROCEDURE — 1160F RVW MEDS BY RX/DR IN RCRD: CPT | Mod: CPTII,,, | Performed by: NURSE PRACTITIONER

## 2024-01-24 PROCEDURE — 3075F SYST BP GE 130 - 139MM HG: CPT | Mod: CPTII,,, | Performed by: NURSE PRACTITIONER

## 2024-01-24 PROCEDURE — 3044F HG A1C LEVEL LT 7.0%: CPT | Mod: CPTII,,, | Performed by: NURSE PRACTITIONER

## 2024-05-14 ENCOUNTER — OFFICE VISIT (OUTPATIENT)
Dept: FAMILY MEDICINE | Facility: CLINIC | Age: 69
End: 2024-05-14
Payer: COMMERCIAL

## 2024-05-14 VITALS
OXYGEN SATURATION: 99 % | SYSTOLIC BLOOD PRESSURE: 131 MMHG | HEIGHT: 69 IN | HEART RATE: 65 BPM | WEIGHT: 193 LBS | DIASTOLIC BLOOD PRESSURE: 79 MMHG | BODY MASS INDEX: 28.58 KG/M2 | RESPIRATION RATE: 20 BRPM

## 2024-05-14 DIAGNOSIS — Z85.46 HISTORY OF PROSTATE CANCER: ICD-10-CM

## 2024-05-14 DIAGNOSIS — I10 ESSENTIAL HYPERTENSION: ICD-10-CM

## 2024-05-14 DIAGNOSIS — Z12.11 SCREENING FOR MALIGNANT NEOPLASM OF COLON: ICD-10-CM

## 2024-05-14 DIAGNOSIS — E78.5 HYPERLIPIDEMIA, UNSPECIFIED HYPERLIPIDEMIA TYPE: ICD-10-CM

## 2024-05-14 DIAGNOSIS — Z00.00 ENCOUNTER FOR SUBSEQUENT ANNUAL WELLNESS VISIT (AWV) IN MEDICARE PATIENT: Primary | ICD-10-CM

## 2024-05-14 PROCEDURE — G0439 PPPS, SUBSEQ VISIT: HCPCS | Mod: ,,, | Performed by: NURSE PRACTITIONER

## 2024-05-14 PROCEDURE — 3044F HG A1C LEVEL LT 7.0%: CPT | Mod: ,,, | Performed by: NURSE PRACTITIONER

## 2024-05-14 PROCEDURE — 3066F NEPHROPATHY DOC TX: CPT | Mod: ,,, | Performed by: NURSE PRACTITIONER

## 2024-05-14 PROCEDURE — 3288F FALL RISK ASSESSMENT DOCD: CPT | Mod: ,,, | Performed by: NURSE PRACTITIONER

## 2024-05-14 PROCEDURE — 1126F AMNT PAIN NOTED NONE PRSNT: CPT | Mod: ,,, | Performed by: NURSE PRACTITIONER

## 2024-05-14 PROCEDURE — 1158F ADVNC CARE PLAN TLK DOCD: CPT | Mod: ,,, | Performed by: NURSE PRACTITIONER

## 2024-05-14 PROCEDURE — 3075F SYST BP GE 130 - 139MM HG: CPT | Mod: ,,, | Performed by: NURSE PRACTITIONER

## 2024-05-14 PROCEDURE — 1159F MED LIST DOCD IN RCRD: CPT | Mod: ,,, | Performed by: NURSE PRACTITIONER

## 2024-05-14 PROCEDURE — 1170F FXNL STATUS ASSESSED: CPT | Mod: ,,, | Performed by: NURSE PRACTITIONER

## 2024-05-14 PROCEDURE — 1160F RVW MEDS BY RX/DR IN RCRD: CPT | Mod: ,,, | Performed by: NURSE PRACTITIONER

## 2024-05-14 PROCEDURE — 3078F DIAST BP <80 MM HG: CPT | Mod: ,,, | Performed by: NURSE PRACTITIONER

## 2024-05-14 PROCEDURE — 1101F PT FALLS ASSESS-DOCD LE1/YR: CPT | Mod: ,,, | Performed by: NURSE PRACTITIONER

## 2024-05-14 PROCEDURE — 3061F NEG MICROALBUMINURIA REV: CPT | Mod: ,,, | Performed by: NURSE PRACTITIONER

## 2024-05-14 NOTE — PATIENT INSTRUCTIONS
Counseling and Referral of Other Preventative  (Italic type indicates deductible and co-insurance are waived)    Patient Name: Kory Douglas  Today's Date: 5/14/2024    Health Maintenance       Date Due Completion Date    COVID-19 Vaccine (7 - 2023-24 season) 12/22/2023 10/27/2023    Colorectal Cancer Screening 06/19/2024 6/19/2019    Diabetes Urine Screening 01/08/2025 1/8/2024    Hemoglobin A1c 01/08/2025 1/8/2024    PROSTATE-SPECIFIC ANTIGEN 01/24/2025 1/24/2024    Lipid Panel 01/08/2029 1/8/2024    TETANUS VACCINE 03/25/2033 3/25/2023        No orders of the defined types were placed in this encounter.      The following information is provided to all patients.  This information is to help you find resources for any of the problems found today that may be affecting your health:                  Living healthy guide: [facility].gov    Understanding Diabetes: www.diabetes.org      Eating healthy: www.cdc.gov/healthyweight      [facility] home safety checklist: www.cdc.gov/steadi/patient.html      Agency on Aging: [facility].org    Alcoholics anonymous (AA): www.aa.org      Physical Activity: www.aron.nih.gov/ve6fyzb      Tobacco use: [facility].gov

## 2024-05-14 NOTE — PROGRESS NOTES
" [facility]       PATIENT NAME: Kory Douglas   : 1955    AGE: 68 y.o. DATE: 2024   MRN: [id number]        Kory Douglas presented for a  Medicare AWV and comprehensive Health Risk Assessment today. The following components were reviewed and updated:    Medical history  Family History  Social history  Allergies and Current Medications  Health Risk Assessment  Health Maintenance  Care Team         ** See Completed Assessments for Annual Wellness Visit within the encounter summary.**         The following assessments were completed:  Living Situation  CAGE  Depression Screening  Timed Get Up and Go  Whisper Test  Cognitive Function Screening  Nutrition Screening  ADL Screening  PAQ Screening      Opioid documentation:      Patient does not have a current opioid prescription.        Vitals:    24 1101   BP: 131/79   BP Location: Left arm   Patient Position: Sitting   BP Method: Large (Automatic)   Pulse: 65   Resp: 20   SpO2: 99%   Weight: 87.5 kg (193 lb)   Height: 5' 9" (1.753 m)     Body mass index is 28.5 kg/m².  Physical Exam  Constitutional:       General: He is not in acute distress.     Appearance: Normal appearance.   HENT:      Head: Normocephalic.   Eyes:      Pupils: Pupils are equal, round, and reactive to light.   Cardiovascular:      Rate and Rhythm: Normal rate and regular rhythm.      Heart sounds: Normal heart sounds. No murmur heard.  Pulmonary:      Effort: Pulmonary effort is normal.      Breath sounds: Normal breath sounds.   Abdominal:      General: Bowel sounds are normal. There is no distension.      Hernia: No hernia is present.   Musculoskeletal:         General: No swelling or tenderness.      Right lower leg: No edema.      Left lower leg: No edema.   Skin:     General: Skin is warm and dry.   Neurological:      General: No focal deficit present.      Mental Status: He is alert and oriented to person, place, " and time.          Diagnoses and health risks identified today and associated recommendations/orders:    1. Encounter for subsequent annual wellness visit (AWV) in Medicare patient  Gave appt reminder for next year's AWV    2. Essential hypertension  BP Readings from Last 3 Encounters:   05/14/24 131/79   01/24/24 130/70   01/08/24 135/74    The current medical regimen is effective;  continue present plan and medications.    3. Hyperlipidemia, unspecified hyperlipidemia type  Lab Results   Component Value Date    CHOL 151 01/08/2024    CHOL 105 07/10/2023    CHOL 112 01/09/2023     Lab Results   Component Value Date    HDL 39 (L) 01/08/2024    HDL 40 07/10/2023    HDL 42 01/09/2023     Lab Results   Component Value Date    LDLCALC 92 01/08/2024    LDLCALC 49 07/10/2023    LDLCALC 50 01/09/2023     Lab Results   Component Value Date    TRIG 99 01/08/2024    TRIG 81 07/10/2023    TRIG 101 01/09/2023       Lab Results   Component Value Date    CHOLHDL 3.9 01/08/2024    CHOLHDL 2.6 07/10/2023    CHOLHDL 2.7 01/09/2023    The current medical regimen is effective;  continue present plan and medications.    4. BMI 28.0-28.9,adult    5. History of prostate cancer  Followed yearly by urology     6. Screening for malignant neoplasm of colon  Routine colonoscopy due soon. Ordered  - Colonoscopy; Future      Provided Kory with a 5-10 year written screening schedule and personal prevention plan. Recommendations were developed using the USPSTF age appropriate recommendations. Education, counseling, and referrals were provided as needed. After Visit Summary printed and given to patient which includes a list of additional screenings\tests needed.    Follow up in about 1 year (around 5/14/2025) for AWV follow-up.    Emi Diego RN    I offered to discuss advanced care planning, including how to pick a person who would make decisions for you if you were unable to make them for yourself, called a health care power of ,  and what kind of decisions you might make such as use of life sustaining treatments such as ventilators and tube feeding when faced with a life limiting illness recorded on a living will that they will need to know. (How you want to be cared for as you near the end of your natural life)     X Patient is interested in learning more about how to make advanced directives.  I provided them paperwork and offered to discuss this with them.    Signature: Evelina Gongoar, ANTONIAP

## 2024-07-08 ENCOUNTER — OFFICE VISIT (OUTPATIENT)
Dept: FAMILY MEDICINE | Facility: CLINIC | Age: 69
End: 2024-07-08
Payer: COMMERCIAL

## 2024-07-08 VITALS
SYSTOLIC BLOOD PRESSURE: 124 MMHG | HEIGHT: 69 IN | WEIGHT: 195.38 LBS | TEMPERATURE: 99 F | HEART RATE: 71 BPM | DIASTOLIC BLOOD PRESSURE: 77 MMHG | RESPIRATION RATE: 18 BRPM | BODY MASS INDEX: 28.94 KG/M2 | OXYGEN SATURATION: 95 %

## 2024-07-08 DIAGNOSIS — I10 ESSENTIAL HYPERTENSION: ICD-10-CM

## 2024-07-08 DIAGNOSIS — E78.5 HYPERLIPIDEMIA, UNSPECIFIED HYPERLIPIDEMIA TYPE: ICD-10-CM

## 2024-07-08 LAB
ALBUMIN SERPL BCP-MCNC: 3.8 G/DL (ref 3.5–5)
ALBUMIN/GLOB SERPL: 1.1 {RATIO}
ALP SERPL-CCNC: 64 U/L (ref 45–115)
ALT SERPL W P-5'-P-CCNC: 26 U/L (ref 16–61)
ANION GAP SERPL CALCULATED.3IONS-SCNC: 11 MMOL/L (ref 7–16)
AST SERPL W P-5'-P-CCNC: 19 U/L (ref 15–37)
BILIRUB SERPL-MCNC: 0.7 MG/DL (ref ?–1.2)
BUN SERPL-MCNC: 13 MG/DL (ref 7–18)
BUN/CREAT SERPL: 14 (ref 6–20)
CALCIUM SERPL-MCNC: 8.6 MG/DL (ref 8.5–10.1)
CHLORIDE SERPL-SCNC: 106 MMOL/L (ref 98–107)
CHOLEST SERPL-MCNC: 181 MG/DL (ref 0–200)
CHOLEST/HDLC SERPL: 4.5 {RATIO}
CO2 SERPL-SCNC: 27 MMOL/L (ref 21–32)
CREAT SERPL-MCNC: 0.94 MG/DL (ref 0.7–1.3)
EGFR (NO RACE VARIABLE) (RUSH/TITUS): 87 ML/MIN/1.73M2
GLOBULIN SER-MCNC: 3.5 G/DL (ref 2–4)
GLUCOSE SERPL-MCNC: 100 MG/DL (ref 74–106)
HDLC SERPL-MCNC: 40 MG/DL (ref 40–60)
LDLC SERPL CALC-MCNC: 114 MG/DL
LDLC/HDLC SERPL: 2.9 {RATIO}
NONHDLC SERPL-MCNC: 141 MG/DL
POTASSIUM SERPL-SCNC: 4 MMOL/L (ref 3.5–5.1)
PROT SERPL-MCNC: 7.3 G/DL (ref 6.4–8.2)
SODIUM SERPL-SCNC: 140 MMOL/L (ref 136–145)
TRIGL SERPL-MCNC: 134 MG/DL (ref 35–150)
VLDLC SERPL-MCNC: 27 MG/DL

## 2024-07-08 PROCEDURE — 3061F NEG MICROALBUMINURIA REV: CPT | Mod: ,,, | Performed by: NURSE PRACTITIONER

## 2024-07-08 PROCEDURE — 3044F HG A1C LEVEL LT 7.0%: CPT | Mod: ,,, | Performed by: NURSE PRACTITIONER

## 2024-07-08 PROCEDURE — 80053 COMPREHEN METABOLIC PANEL: CPT | Mod: ,,, | Performed by: CLINICAL MEDICAL LABORATORY

## 2024-07-08 PROCEDURE — 80061 LIPID PANEL: CPT | Mod: ,,, | Performed by: CLINICAL MEDICAL LABORATORY

## 2024-07-08 PROCEDURE — 3008F BODY MASS INDEX DOCD: CPT | Mod: ,,, | Performed by: NURSE PRACTITIONER

## 2024-07-08 PROCEDURE — 3066F NEPHROPATHY DOC TX: CPT | Mod: ,,, | Performed by: NURSE PRACTITIONER

## 2024-07-08 PROCEDURE — 99214 OFFICE O/P EST MOD 30 MIN: CPT | Mod: ,,, | Performed by: NURSE PRACTITIONER

## 2024-07-08 PROCEDURE — 3078F DIAST BP <80 MM HG: CPT | Mod: ,,, | Performed by: NURSE PRACTITIONER

## 2024-07-08 PROCEDURE — 1159F MED LIST DOCD IN RCRD: CPT | Mod: ,,, | Performed by: NURSE PRACTITIONER

## 2024-07-08 PROCEDURE — 3074F SYST BP LT 130 MM HG: CPT | Mod: ,,, | Performed by: NURSE PRACTITIONER

## 2024-07-08 PROCEDURE — 1160F RVW MEDS BY RX/DR IN RCRD: CPT | Mod: ,,, | Performed by: NURSE PRACTITIONER

## 2024-07-08 RX ORDER — AMLODIPINE BESYLATE 10 MG/1
10 TABLET ORAL DAILY
Qty: 90 TABLET | Refills: 1 | Status: SHIPPED | OUTPATIENT
Start: 2024-07-08

## 2024-07-08 RX ORDER — HYDROCHLOROTHIAZIDE 25 MG/1
25 TABLET ORAL DAILY
Qty: 90 TABLET | Refills: 1 | Status: SHIPPED | OUTPATIENT
Start: 2024-07-08

## 2024-07-08 RX ORDER — ROSUVASTATIN CALCIUM 10 MG/1
10 TABLET, COATED ORAL NIGHTLY
Qty: 90 TABLET | Refills: 1 | Status: SHIPPED | OUTPATIENT
Start: 2024-07-08

## 2024-07-08 NOTE — ASSESSMENT & PLAN NOTE
Lab Results   Component Value Date    CHOL 151 01/08/2024    CHOL 105 07/10/2023    CHOL 112 01/09/2023     Lab Results   Component Value Date    HDL 39 (L) 01/08/2024    HDL 40 07/10/2023    HDL 42 01/09/2023     Lab Results   Component Value Date    LDLCALC 92 01/08/2024    LDLCALC 49 07/10/2023    LDLCALC 50 01/09/2023     Lab Results   Component Value Date    TRIG 99 01/08/2024    TRIG 81 07/10/2023    TRIG 101 01/09/2023       Lab Results   Component Value Date    CHOLHDL 3.9 01/08/2024    CHOLHDL 2.6 07/10/2023    CHOLHDL 2.7 01/09/2023    The current medical regimen is effective;  continue present plan and medications.

## 2024-07-08 NOTE — PROGRESS NOTES
DRISS Lowe   [facility] [address] [place] [address]  [phone]      PATIENT NAME: Kory Douglas  : 1955  DATE: 24  MRN: [id number]      Billing Provider: DRISS Lowe  Level of Service:   Patient PCP Information       Provider PCP Type    DRISS Lowe General            Reason for Visit / Chief Complaint: Hypertension, Hyperlipidemia, and Follow-up (6 mos)       Update PCP  Update Chief Complaint         History of Present Illness / Problem Focused Workflow     Kory Douglas presents to the clinic with Hypertension, Hyperlipidemia, and Follow-up (6 mos)     Pt presents for routine follow up with lab and med refills. Overall doing well.      BP appears  controlled today. Home meds reviewed  The current medical regimen is effective;  continue present plan and medications. Recommended patient to check home readings to monitor and see me for followup as scheduled or sooner as needed.   Discussed sodium restriction, maintaining ideal body weight and regular exercise program as physiologic means to continue to achieve blood pressure control in addition to medication compliance.  Patient was educated that both decongestant and anti-inflammatory medication may raise blood pressure.  Advised to monitor BP at home. Advised on optimal BP readings - SBP < 130 & DBP < 80. Advised to call office for any persistent BP elevation and may have to prescribe or adjust BP med(s).  Recommended DASH diet, stay well hydrated with water daily, eliminate or decrease caffeinated and high calorie drinks, increase physical activity, and lose weight if BMI > 25.0. Written patient education information provided to patient with goals and recommendations to assist with BP management.     Discussed need for low fat/low cholesterol diet, regular exercise, and weight control.   Cardiovascular risk and specific  lipid/LDL goals reviewed.        Review of Systems     Review of Systems   Constitutional:  Negative for fatigue and fever.   HENT:  Negative for nasal congestion and sore throat.    Eyes:  Negative for visual disturbance.   Respiratory:  Negative for chest tightness and shortness of breath.    Cardiovascular:  Negative for chest pain and leg swelling.   Gastrointestinal:  Negative for abdominal pain and change in bowel habit.   Endocrine: Negative for polydipsia, polyphagia and polyuria.   Genitourinary:  Negative for dysuria and hematuria.   Musculoskeletal:  Negative for back pain and leg pain.   Integumentary:  Negative for rash.   Neurological:  Negative for dizziness, syncope, weakness and light-headedness.        Medical / Social / Family History     Past Medical History:   Diagnosis Date    Cancer of prostate 12/16/2021     radical prostatectomy for Clari's 4+4 disease and tertiary pattern of Clari's 5 disease. The patient's tumor invades the bladder neck in the resection margins are positive areas. did not get XRT.    Erectile dysfunction after radical prostatectomy 12/16/2021    No meds tried to date Reviewed all methods of ED treatment    Hyperlipidemia     Hypertension     Personal history of colonic polyps 06/19/2019       Past Surgical History:   Procedure Laterality Date    ADENOIDECTOMY      COLONOSCOPY W/ BIOPSIES  06/19/2019    PROSTATE SURGERY         Social History  Mr. Douglas  reports that he has never smoked. He has been exposed to tobacco smoke. He has never used smokeless tobacco. He reports that he does not drink alcohol and does not use drugs.    Family History  Mr. Douglas's family history includes No Known Problems in his father and mother.    Medications and Allergies     Medications  Outpatient Medications Marked as Taking for the 7/8/24 encounter (Office Visit) with Evelina Gongora ACNP   Medication Sig Dispense Refill    calcium carbonate (OS-SOL) 500 mg calcium (1,250 mg) tablet  Take 1 tablet (500 mg total) by mouth once daily. 90 tablet 3    sildenafiL (VIAGRA) 25 MG tablet Take 1-5 tabs (25mg - 100 mg) po one hour prior to intercourse on an empty stomach.  Use lowest effective dose. 40 tablet 5       Allergies  Review of patient's allergies indicates:  No Known Allergies    Physical Examination     Vitals:    07/08/24 0819   BP: 124/77   Pulse: 71   Resp: 18   Temp: 98.5 °F (36.9 °C)     Physical Exam  Eyes:      Pupils: Pupils are equal, round, and reactive to light.   Cardiovascular:      Rate and Rhythm: Normal rate and regular rhythm.      Heart sounds: Normal heart sounds. No murmur heard.  Pulmonary:      Breath sounds: Normal breath sounds. No wheezing, rhonchi or rales.   Abdominal:      General: Bowel sounds are normal.      Palpations: Abdomen is soft.      Tenderness: There is no abdominal tenderness.   Musculoskeletal:         General: No swelling.      Cervical back: Normal range of motion and neck supple.   Skin:     General: Skin is warm and dry.   Neurological:      Mental Status: He is alert and oriented to person, place, and time.          Lab Results   Component Value Date    WBC 7.97 07/10/2023    HGB 13.1 (L) 07/10/2023    HCT 43.1 07/10/2023    MCV 90.9 07/10/2023     07/10/2023        Sodium   Date Value Ref Range Status   01/08/2024 140 136 - 145 mmol/L Final     Potassium   Date Value Ref Range Status   01/08/2024 3.9 3.5 - 5.1 mmol/L Final     Chloride   Date Value Ref Range Status   01/08/2024 107 98 - 107 mmol/L Final     CO2   Date Value Ref Range Status   01/08/2024 29 21 - 32 mmol/L Final     Glucose   Date Value Ref Range Status   01/08/2024 97 74 - 106 mg/dL Final     BUN   Date Value Ref Range Status   01/08/2024 16 7 - 18 mg/dL Final     Creatinine   Date Value Ref Range Status   01/08/2024 0.96 0.70 - 1.30 mg/dL Final     Calcium   Date Value Ref Range Status   01/08/2024 8.7 8.5 - 10.1 mg/dL Final     Total Protein   Date Value Ref Range Status  "  01/08/2024 7.1 6.4 - 8.2 g/dL Final     Albumin   Date Value Ref Range Status   01/08/2024 3.9 3.5 - 5.0 g/dL Final     Bilirubin, Total   Date Value Ref Range Status   01/08/2024 0.8 >0.0 - 1.2 mg/dL Final     Alk Phos   Date Value Ref Range Status   01/08/2024 72 45 - 115 U/L Final     AST   Date Value Ref Range Status   01/08/2024 15 15 - 37 U/L Final     ALT   Date Value Ref Range Status   01/08/2024 26 16 - 61 U/L Final     Anion Gap   Date Value Ref Range Status   01/08/2024 8 7 - 16 mmol/L Final     eGFR   Date Value Ref Range Status   01/08/2024 86 >=60 mL/min/1.73m2 Final      Lab Results   Component Value Date    HGBA1C 5.4 01/08/2024      Lab Results   Component Value Date    CHOL 151 01/08/2024    CHOL 105 07/10/2023    CHOL 112 01/09/2023     Lab Results   Component Value Date    HDL 39 (L) 01/08/2024    HDL 40 07/10/2023    HDL 42 01/09/2023     Lab Results   Component Value Date    LDLCALC 92 01/08/2024    LDLCALC 49 07/10/2023    LDLCALC 50 01/09/2023     No results found for: "DLDL"  Lab Results   Component Value Date    TRIG 99 01/08/2024    TRIG 81 07/10/2023    TRIG 101 01/09/2023     Lab Results   Component Value Date    CHOLHDL 3.9 01/08/2024    CHOLHDL 2.6 07/10/2023    CHOLHDL 2.7 01/09/2023      Lab Results   Component Value Date    TSH 0.793 02/07/2022        Assessment and Plan (including Health Maintenance)      Problem List  Smart Sets  Document Outside HM   :    Plan:     1. Essential hypertension  Assessment & Plan:  BP Readings from Last 3 Encounters:   07/08/24 124/77   05/14/24 131/79   01/24/24 130/70    The current medical regimen is effective;  continue present plan and medications.      Orders:  -     amLODIPine (NORVASC) 10 MG tablet; Take 1 tablet (10 mg total) by mouth once daily.  Dispense: 90 tablet; Refill: 1  -     hydroCHLOROthiazide (HYDRODIURIL) 25 MG tablet; Take 1 tablet (25 mg total) by mouth once daily.  Dispense: 90 tablet; Refill: 1  -     Comprehensive " Metabolic Panel; Future; Expected date: 07/08/2024    2. Hyperlipidemia, unspecified hyperlipidemia type  Assessment & Plan:  Lab Results   Component Value Date    CHOL 151 01/08/2024    CHOL 105 07/10/2023    CHOL 112 01/09/2023     Lab Results   Component Value Date    HDL 39 (L) 01/08/2024    HDL 40 07/10/2023    HDL 42 01/09/2023     Lab Results   Component Value Date    LDLCALC 92 01/08/2024    LDLCALC 49 07/10/2023    LDLCALC 50 01/09/2023     Lab Results   Component Value Date    TRIG 99 01/08/2024    TRIG 81 07/10/2023    TRIG 101 01/09/2023       Lab Results   Component Value Date    CHOLHDL 3.9 01/08/2024    CHOLHDL 2.6 07/10/2023    CHOLHDL 2.7 01/09/2023    The current medical regimen is effective;  continue present plan and medications.      Orders:  -     rosuvastatin (CRESTOR) 10 MG tablet; Take 1 tablet (10 mg total) by mouth every evening.  Dispense: 90 tablet; Refill: 1  -     Lipid Panel; Future; Expected date: 07/08/2024         There are no Patient Instructions on file for this visit.     Health Maintenance Due   Topic Date Due    Colorectal Cancer Screening  06/19/2024         Health Maintenance Topics with due status: Not Due       Topic Last Completion Date    TETANUS VACCINE 03/25/2023    Influenza Vaccine 08/29/2023    Diabetes Urine Screening 01/08/2024    Lipid Panel 01/08/2024    Hemoglobin A1c 01/08/2024    PROSTATE-SPECIFIC ANTIGEN 01/24/2024       Future Appointments   Date Time Provider Department Center   11/12/2024  8:00 AM [facility] GI ROOM 03 [facility]   1/9/2025  8:00 AM Evelina Gongora ACNP [facility] [place]   5/15/2025 10:00 AM AW[facility] NURSE, [facility] Maza Merlene            Signature:  DRISS Lowe  [facility] [address] [place] [address]  [phone]    Date of encounter: 7/8/24

## 2024-07-08 NOTE — ASSESSMENT & PLAN NOTE
BP Readings from Last 3 Encounters:   07/08/24 124/77   05/14/24 131/79   01/24/24 130/70    The current medical regimen is effective;  continue present plan and medications.

## 2024-08-09 DIAGNOSIS — Z71.89 COMPLEX CARE COORDINATION: ICD-10-CM

## 2024-11-12 ENCOUNTER — HOSPITAL ENCOUNTER (OUTPATIENT)
Dept: GASTROENTEROLOGY | Facility: HOSPITAL | Age: 69
Discharge: HOME | End: 2024-11-12
Attending: NURSE PRACTITIONER | Admitting: INTERNAL MEDICINE
Payer: COMMERCIAL

## 2024-11-12 ENCOUNTER — ANESTHESIA EVENT (OUTPATIENT)
Dept: GASTROENTEROLOGY | Facility: HOSPITAL | Age: 69
End: 2024-11-12
Payer: COMMERCIAL

## 2024-11-12 ENCOUNTER — ANESTHESIA (OUTPATIENT)
Dept: GASTROENTEROLOGY | Facility: HOSPITAL | Age: 69
End: 2024-11-12
Payer: COMMERCIAL

## 2024-11-12 VITALS
SYSTOLIC BLOOD PRESSURE: 124 MMHG | BODY MASS INDEX: 28.58 KG/M2 | HEIGHT: 69 IN | HEART RATE: 62 BPM | RESPIRATION RATE: 20 BRPM | WEIGHT: 193 LBS | DIASTOLIC BLOOD PRESSURE: 67 MMHG | TEMPERATURE: 98 F | OXYGEN SATURATION: 98 %

## 2024-11-12 DIAGNOSIS — Z12.11 SCREENING FOR MALIGNANT NEOPLASM OF COLON: ICD-10-CM

## 2024-11-12 PROCEDURE — 63600175 PHARM REV CODE 636 W HCPCS: Performed by: NURSE ANESTHETIST, CERTIFIED REGISTERED

## 2024-11-12 PROCEDURE — D9220A PRA ANESTHESIA: Mod: PT,,, | Performed by: NURSE ANESTHETIST, CERTIFIED REGISTERED

## 2024-11-12 PROCEDURE — 88305 TISSUE EXAM BY PATHOLOGIST: CPT | Mod: 26,,, | Performed by: PATHOLOGY

## 2024-11-12 PROCEDURE — 88305 TISSUE EXAM BY PATHOLOGIST: CPT | Mod: TC,SUR | Performed by: INTERNAL MEDICINE

## 2024-11-12 PROCEDURE — 27201423 OPTIME MED/SURG SUP & DEVICES STERILE SUPPLY

## 2024-11-12 PROCEDURE — 37000008 HC ANESTHESIA 1ST 15 MINUTES

## 2024-11-12 PROCEDURE — 37000009 HC ANESTHESIA EA ADD 15 MINS

## 2024-11-12 RX ORDER — PHENYLEPHRINE HYDROCHLORIDE 10 MG/ML
INJECTION INTRAVENOUS
Status: DISCONTINUED | OUTPATIENT
Start: 2024-11-12 | End: 2024-11-12

## 2024-11-12 RX ORDER — SODIUM CHLORIDE 0.9 % (FLUSH) 0.9 %
10 SYRINGE (ML) INJECTION EVERY 6 HOURS PRN
Status: DISCONTINUED | OUTPATIENT
Start: 2024-11-12 | End: 2024-11-13 | Stop reason: HOSPADM

## 2024-11-12 RX ORDER — PROPOFOL 10 MG/ML
VIAL (ML) INTRAVENOUS
Status: DISCONTINUED | OUTPATIENT
Start: 2024-11-12 | End: 2024-11-12

## 2024-11-12 RX ORDER — SODIUM CHLORIDE, SODIUM LACTATE, POTASSIUM CHLORIDE, CALCIUM CHLORIDE 600; 310; 30; 20 MG/100ML; MG/100ML; MG/100ML; MG/100ML
INJECTION, SOLUTION INTRAVENOUS CONTINUOUS
Status: DISCONTINUED | OUTPATIENT
Start: 2024-11-12 | End: 2024-11-13 | Stop reason: HOSPADM

## 2024-11-12 RX ORDER — LIDOCAINE HYDROCHLORIDE 20 MG/ML
INJECTION, SOLUTION EPIDURAL; INFILTRATION; INTRACAUDAL; PERINEURAL
Status: DISCONTINUED | OUTPATIENT
Start: 2024-11-12 | End: 2024-11-12

## 2024-11-12 RX ADMIN — PROPOFOL 40 MG: 10 INJECTION, EMULSION INTRAVENOUS at 09:11

## 2024-11-12 RX ADMIN — PROPOFOL 30 MG: 10 INJECTION, EMULSION INTRAVENOUS at 09:11

## 2024-11-12 RX ADMIN — PHENYLEPHRINE HYDROCHLORIDE 100 MCG: 10 INJECTION INTRAVENOUS at 09:11

## 2024-11-12 RX ADMIN — PROPOFOL 20 MG: 10 INJECTION, EMULSION INTRAVENOUS at 09:11

## 2024-11-12 RX ADMIN — LIDOCAINE HYDROCHLORIDE 100 MG: 20 INJECTION, SOLUTION INTRAVENOUS at 09:11

## 2024-11-12 RX ADMIN — PROPOFOL 50 MG: 10 INJECTION, EMULSION INTRAVENOUS at 09:11

## 2024-11-12 NOTE — ANESTHESIA POSTPROCEDURE EVALUATION
Anesthesia Post Evaluation    Patient: Kory Douglas    Procedure(s) Performed: Colonoscopy       Final Anesthesia Type: MAC      Patient location during evaluation: GI PACU  Patient participation: Yes- Able to Participate  Level of consciousness: awake and alert  Post-procedure vital signs: reviewed and stable  Pain management: adequate  Airway patency: patent  ELLIOTT mitigation strategies: Multimodal analgesia  PONV status at discharge: No PONV  Anesthetic complications: no      Cardiovascular status: hemodynamically stable and blood pressure returned to baseline  Respiratory status: spontaneous ventilation and room air  Hydration status: euvolemic  Follow-up not needed.  Comments: Refer to nursing note for pain/ralf score upon discharge              Vitals Value Taken Time   /67 11/12/24 1015   Temp 36.9 °C (98.4 °F) 11/12/24 0932   Pulse 62 11/12/24 1015   Resp 20 11/12/24 1015   SpO2 98 % 11/12/24 1015         Event Time   Out of Recovery 10:19:49         Pain/Ralf Score: Ralf Score: 10 (11/12/2024  9:46 AM)

## 2024-11-12 NOTE — DISCHARGE INSTRUCTIONS
Procedure Date  11/12/24     Impression  Overall Impression:   Subcentimeter polyp in the transverse colon was removed with cold snare  The terminal ileum, cecum, ascending colon, transverse colon, descending colon, sigmoid colon and rectum appeared normal.     Recommendation  - Repeat colonoscopy in 5 years  - Discharge patient to home  - Advance diet as tolerated  - Continue present medications  - Await pathology results  - Patient has a contact number available for emergencies. The signs and symptoms of potential delayed complications were discussed with the patient. Return to normal activities tomorrow. Written discharge instructions were provided to the patient    NO DRIVING, OPERATING EQUIPMENT, OR SIGNING LEGAL DOCUMENTS FOR 24 HOURS.THE NURSE WILL CALL YOU WITH YOUR BIOPSY RESULTS IN A FEW DAYS. IF YOU HAVE  [facility] MYCHART YOUR RESULTS WILL APPEAR THERE AS WELL.Please call the GI Lab if you have any nausea, vomiting, or abdominal pain.

## 2024-11-12 NOTE — TRANSFER OF CARE
"Anesthesia Transfer of Care Note    Patient: Kory Douglas    Procedure(s) Performed: Colonoscopy     Patient location: GI    Anesthesia Type: MAC    Transport from OR: Transported from OR on room air with adequate spontaneous ventilation. Continuous ECG monitoring in transport. Continuous SpO2 monitoring in transport    Post pain: adequate analgesia    Post assessment: no apparent anesthetic complications    Post vital signs: stable    Level of consciousness: responds to stimulation, awake and sedated    Nausea/Vomiting: no nausea/vomiting    Complications: none    Transfer of care protocol was followed      Last vitals: Visit Vitals  BP (!) 100/54   Pulse 63   Temp 36.9 °C (98.4 °F)   Resp 15   Ht 5' 9" (1.753 m)   Wt 87.5 kg (193 lb)   SpO2 99%   BMI 28.50 kg/m²     "

## 2024-11-12 NOTE — ANESTHESIA PREPROCEDURE EVALUATION
11/12/2024  Kory Douglas is a 69 y.o., male.      Pre-op Assessment    I have reviewed the Patient Summary Reports.    I have reviewed the NPO Status.   I have reviewed the Medications.     Review of Systems  Anesthesia Hx:             Denies Family Hx of Anesthesia complications.    Denies Personal Hx of Anesthesia complications.                    Social:  Non-Smoker, No Alcohol Use       Hematology/Oncology:                        --  Cancer in past history:                 Oncology Comments: Prostate s/p radical prostatectomy      Cardiovascular:     Hypertension       Denies Angina.     hyperlipidemia    Patient states he stopped taking medications for HLD                            Pulmonary:       Denies Shortness of breath.                  Renal/:  Renal/ Normal                 Hepatic/GI:  Bowel Prep.                   Neurological:           Tremors                                 Physical Exam  General: Well nourished    Airway:  Mallampati: II   Mouth Opening: Normal  TM Distance: Normal  Tongue: Normal  Neck ROM: Normal ROM    Dental:  Intact        Anesthesia Plan  Type of Anesthesia, risks & benefits discussed:    Anesthesia Type: MAC  Intra-op Monitoring Plan: Standard ASA Monitors  Post Op Pain Control Plan: multimodal analgesia  Induction:  IV  Informed Consent: Informed consent signed with the Patient and all parties understand the risks and agree with anesthesia plan.  All questions answered. Patient consented to blood products? Yes  ASA Score: 3  Day of Surgery Review of History & Physical: H&P Update referred to the surgeon/provider.I have interviewed and examined the patient. I have reviewed the patient's H&P dated: There are no significant changes.     Ready For Surgery From Anesthesia Perspective.     .    Past Medical History:   Diagnosis Date    Cancer of prostate 12/16/2021      radical prostatectomy for [place]'s 4+4 disease and tertiary pattern of [place]'s 5 disease. The patient's tumor invades the bladder neck in the resection margins are positive areas. did not get XRT.    Erectile dysfunction after radical prostatectomy 12/16/2021    No meds tried to date Reviewed all methods of ED treatment    Hyperlipidemia     Hypertension     Personal history of colonic polyps 06/19/2019       Past Surgical History:   Procedure Laterality Date    ADENOIDECTOMY      COLONOSCOPY W/ BIOPSIES  06/19/2019    PROSTATE SURGERY         Family History   Problem Relation Name Age of Onset    No Known Problems Mother      No Known Problems Father         Social History     Socioeconomic History    Marital status:    Tobacco Use    Smoking status: Never     Passive exposure: Past    Smokeless tobacco: Never   Substance and Sexual Activity    Alcohol use: Never    Drug use: Never    Sexual activity: Yes     Social Drivers of Health     Financial Resource Strain: Low Risk  (5/14/2024)    Overall Financial Resource Strain (CARDIA)     Difficulty of Paying Living Expenses: Not hard at all   Food Insecurity: No Food Insecurity (5/14/2024)    Hunger Vital Sign     Worried About Running Out of Food in the Last Year: Never true     Ran Out of Food in the Last Year: Never true   Transportation Needs: No Transportation Needs (5/14/2024)    PRAPARE - Transportation     Lack of Transportation (Medical): No     Lack of Transportation (Non-Medical): No   Physical Activity: Sufficiently Active (5/14/2024)    Exercise Vital Sign     Days of Exercise per Week: 5 days     Minutes of Exercise per Session: 30 min   Stress: No Stress Concern Present (5/14/2024)    [employer] [place] - Occupational Stress Questionnaire     Feeling of Stress : Not at all   Housing Stability: Low Risk  (5/14/2024)    Housing Stability Vital Sign     Unable to Pay for Housing in the Last Year: No     Homeless in the Last  Year: No       Current Outpatient Medications   Medication Sig Dispense Refill    amLODIPine (NORVASC) 10 MG tablet Take 1 tablet (10 mg total) by mouth once daily. 90 tablet 1    calcium carbonate (OS-SOL) 500 mg calcium (1,250 mg) tablet Take 1 tablet (500 mg total) by mouth once daily. 90 tablet 3    hydroCHLOROthiazide (HYDRODIURIL) 25 MG tablet Take 1 tablet (25 mg total) by mouth once daily. 90 tablet 1    rosuvastatin (CRESTOR) 10 MG tablet Take 1 tablet (10 mg total) by mouth every evening. 90 tablet 1    sildenafiL (VIAGRA) 25 MG tablet Take 1-5 tabs (25mg - 100 mg) po one hour prior to intercourse on an empty stomach.  Use lowest effective dose. 40 tablet 5     No current facility-administered medications for this encounter.       Review of patient's allergies indicates:  No Known Allergies     Outpatient Medications as of 11/12/2024   Medication Sig Dispense Refill    amLODIPine (NORVASC) 10 MG tablet Take 1 tablet (10 mg total) by mouth once daily. 90 tablet 1    calcium carbonate (OS-SOL) 500 mg calcium (1,250 mg) tablet Take 1 tablet (500 mg total) by mouth once daily. 90 tablet 3    hydroCHLOROthiazide (HYDRODIURIL) 25 MG tablet Take 1 tablet (25 mg total) by mouth once daily. 90 tablet 1    rosuvastatin (CRESTOR) 10 MG tablet Take 1 tablet (10 mg total) by mouth every evening. 90 tablet 1    sildenafiL (VIAGRA) 25 MG tablet Take 1-5 tabs (25mg - 100 mg) po one hour prior to intercourse on an empty stomach.  Use lowest effective dose. 40 tablet 5     No current facility-administered medications on file as of 11/12/2024.        Chemistry        Component Value Date/Time     07/08/2024 0834    K 4.0 07/08/2024 0834     07/08/2024 0834    CO2 27 07/08/2024 0834    BUN 13 07/08/2024 0834    CREATININE 0.94 07/08/2024 0834     07/08/2024 0834        Component Value Date/Time    CALCIUM 8.6 07/08/2024 0834    ALKPHOS 64 07/08/2024 0834    AST 19 07/08/2024 0834    ALT 26 07/08/2024 0834     BILITOT 0.7 07/08/2024 0834    ESTGFRAFRICA 108 09/01/2021 0827    EGFRNONAA 89 07/21/2022 0842        Lab Results   Component Value Date    WBC 7.97 07/10/2023    HGB 13.1 (L) 07/10/2023    HCT 43.1 07/10/2023     07/10/2023     No results found for this or any previous visit.

## 2024-11-12 NOTE — H&P
History & Physical - Short Stay  Gastroenterology      SUBJECTIVE:     Procedure: Colonoscopy    Chief Complaint/Indication for Procedure: Previous Polyps    (Not in a hospital admission)      Review of patient's allergies indicates:  No Known Allergies     Past Medical History:   Diagnosis Date    Cancer of prostate 12/16/2021     radical prostatectomy for Clari's 4+4 disease and tertiary pattern of [place]'s 5 disease. The patient's tumor invades the bladder neck in the resection margins are positive areas. did not get XRT.    Erectile dysfunction after radical prostatectomy 12/16/2021    No meds tried to date Reviewed all methods of ED treatment    Hyperlipidemia     Hypertension     Personal history of colonic polyps 06/19/2019     Past Surgical History:   Procedure Laterality Date    ADENOIDECTOMY      COLONOSCOPY W/ BIOPSIES  06/19/2019    PROSTATE SURGERY       Family History   Problem Relation Name Age of Onset    No Known Problems Mother      No Known Problems Father       Social History     Tobacco Use    Smoking status: Never     Passive exposure: Past    Smokeless tobacco: Never   Substance Use Topics    Alcohol use: Never    Drug use: Never         OBJECTIVE:     Vital Signs (Most Recent)  Temp: 98 °F (36.7 °C) (11/12/24 0842)  Pulse: 77 (11/12/24 0842)  Resp: (!) 29 (11/12/24 0842)  BP: (!) 146/83 (11/12/24 0842)  SpO2: 98 % (11/12/24 0842)    Physical Exam:                                                       GENERAL:  Comfortable, in no acute distress.                                 HEENT EXAM:  Nonicteric.  No adenopathy.  Oropharynx is clear.               NECK:  Supple.                                                               LUNGS:  Clear.                                                               CARDIAC:  Regular rate and rhythm.  S1, S2.  No murmur.                      ABDOMEN:  Soft, positive bowel sounds, nontender.  No hepatosplenomegaly or masses.  No rebound or guarding.                                              EXTREMITIES:  No edema.     MENTAL STATUS:  Normal, alert and oriented.      ASSESSMENT/PLAN:     Assessment: Previous Polyps    Plan: Colonoscopy

## 2024-11-13 LAB
ESTROGEN SERPL-MCNC: NORMAL PG/ML
INSULIN SERPL-ACNC: NORMAL U[IU]/ML
LAB AP GROSS DESCRIPTION: NORMAL
LAB AP LABORATORY NOTES: NORMAL
T3RU NFR SERPL: NORMAL %

## 2024-11-27 ENCOUNTER — TELEPHONE (OUTPATIENT)
Dept: GASTROENTEROLOGY | Facility: CLINIC | Age: 69
End: 2024-11-27
Payer: COMMERCIAL

## 2024-11-27 NOTE — TELEPHONE ENCOUNTER
Spoke with patient via phone call,  Notified patient of benign results and to repeat Colonoscopy in 5 years.  Placing a 5 year reminder in EPIC    ----- Message from Jay Bowen MD sent at 11/25/2024  3:25 PM CST -----  Please advise patient that polyp pathology was reviewed and is benign and is the adenomatous type which is a precancerous/risk factor for cancer. Repeat colonoscopy recommended in 5 years. Place reminder in system for repeat colonoscopy.

## 2025-01-09 ENCOUNTER — OFFICE VISIT (OUTPATIENT)
Dept: FAMILY MEDICINE | Facility: CLINIC | Age: 70
End: 2025-01-09
Payer: MEDICARE

## 2025-01-09 VITALS
DIASTOLIC BLOOD PRESSURE: 79 MMHG | SYSTOLIC BLOOD PRESSURE: 156 MMHG | BODY MASS INDEX: 28.47 KG/M2 | TEMPERATURE: 99 F | RESPIRATION RATE: 16 BRPM | WEIGHT: 192.19 LBS | OXYGEN SATURATION: 97 % | HEIGHT: 69 IN | HEART RATE: 69 BPM

## 2025-01-09 DIAGNOSIS — Z13.1 SCREENING FOR DIABETES MELLITUS: ICD-10-CM

## 2025-01-09 DIAGNOSIS — I10 ESSENTIAL HYPERTENSION: Primary | ICD-10-CM

## 2025-01-09 DIAGNOSIS — Z12.5 ENCOUNTER FOR SCREENING FOR MALIGNANT NEOPLASM OF PROSTATE: ICD-10-CM

## 2025-01-09 DIAGNOSIS — Z85.46 HISTORY OF PROSTATE CANCER: ICD-10-CM

## 2025-01-09 DIAGNOSIS — C61 CANCER OF PROSTATE: ICD-10-CM

## 2025-01-09 DIAGNOSIS — R73.9 HYPERGLYCEMIA: ICD-10-CM

## 2025-01-09 DIAGNOSIS — E78.5 HYPERLIPIDEMIA, UNSPECIFIED HYPERLIPIDEMIA TYPE: ICD-10-CM

## 2025-01-09 LAB
ALBUMIN SERPL BCP-MCNC: 4.1 G/DL (ref 3.4–4.8)
ALBUMIN/GLOB SERPL: 1.2 {RATIO}
ALP SERPL-CCNC: 66 U/L (ref 40–150)
ALT SERPL W P-5'-P-CCNC: 15 U/L
ANION GAP SERPL CALCULATED.3IONS-SCNC: 12 MMOL/L (ref 7–16)
AST SERPL W P-5'-P-CCNC: 24 U/L (ref 5–34)
BILIRUB SERPL-MCNC: 0.9 MG/DL
BUN SERPL-MCNC: 14 MG/DL (ref 8–26)
BUN/CREAT SERPL: 16 (ref 6–20)
CALCIUM SERPL-MCNC: 8.8 MG/DL (ref 8.8–10)
CHLORIDE SERPL-SCNC: 105 MMOL/L (ref 98–107)
CHOLEST SERPL-MCNC: 175 MG/DL
CHOLEST/HDLC SERPL: 4.5 {RATIO}
CO2 SERPL-SCNC: 28 MMOL/L (ref 23–31)
CREAT SERPL-MCNC: 0.9 MG/DL (ref 0.72–1.25)
CREAT UR-MCNC: 171 MG/DL (ref 23–375)
EGFR (NO RACE VARIABLE) (RUSH/TITUS): 92 ML/MIN/1.73M2
EST. AVERAGE GLUCOSE BLD GHB EST-MCNC: 103 MG/DL
GLOBULIN SER-MCNC: 3.3 G/DL (ref 2–4)
GLUCOSE SERPL-MCNC: 82 MG/DL (ref 82–115)
HBA1C MFR BLD HPLC: 5.2 %
HDLC SERPL-MCNC: 39 MG/DL (ref 35–60)
LDLC SERPL CALC-MCNC: 116 MG/DL
LDLC/HDLC SERPL: 3 {RATIO}
MICROALBUMIN UR-MCNC: 0.9 MG/DL
MICROALBUMIN/CREAT RATIO PNL UR: 5.3 MG/G (ref 0–30)
NONHDLC SERPL-MCNC: 136 MG/DL
POTASSIUM SERPL-SCNC: 4.1 MMOL/L (ref 3.5–5.1)
PROT SERPL-MCNC: 7.4 G/DL (ref 5.8–7.6)
PSA SERPL-MCNC: 0 NG/ML
SODIUM SERPL-SCNC: 141 MMOL/L (ref 136–145)
TRIGL SERPL-MCNC: 101 MG/DL (ref 34–140)
VLDLC SERPL-MCNC: 20 MG/DL

## 2025-01-09 PROCEDURE — 1101F PT FALLS ASSESS-DOCD LE1/YR: CPT | Mod: ,,, | Performed by: NURSE PRACTITIONER

## 2025-01-09 PROCEDURE — 84153 ASSAY OF PSA TOTAL: CPT | Mod: ,,, | Performed by: CLINICAL MEDICAL LABORATORY

## 2025-01-09 PROCEDURE — 1160F RVW MEDS BY RX/DR IN RCRD: CPT | Mod: ,,, | Performed by: NURSE PRACTITIONER

## 2025-01-09 PROCEDURE — 83036 HEMOGLOBIN GLYCOSYLATED A1C: CPT | Mod: ,,, | Performed by: CLINICAL MEDICAL LABORATORY

## 2025-01-09 PROCEDURE — 1126F AMNT PAIN NOTED NONE PRSNT: CPT | Mod: ,,, | Performed by: NURSE PRACTITIONER

## 2025-01-09 PROCEDURE — 1159F MED LIST DOCD IN RCRD: CPT | Mod: ,,, | Performed by: NURSE PRACTITIONER

## 2025-01-09 PROCEDURE — 82043 UR ALBUMIN QUANTITATIVE: CPT | Mod: ,,, | Performed by: CLINICAL MEDICAL LABORATORY

## 2025-01-09 PROCEDURE — 3078F DIAST BP <80 MM HG: CPT | Mod: ,,, | Performed by: NURSE PRACTITIONER

## 2025-01-09 PROCEDURE — 3008F BODY MASS INDEX DOCD: CPT | Mod: ,,, | Performed by: NURSE PRACTITIONER

## 2025-01-09 PROCEDURE — 80061 LIPID PANEL: CPT | Mod: ,,, | Performed by: CLINICAL MEDICAL LABORATORY

## 2025-01-09 PROCEDURE — 99214 OFFICE O/P EST MOD 30 MIN: CPT | Mod: ,,, | Performed by: NURSE PRACTITIONER

## 2025-01-09 PROCEDURE — 82570 ASSAY OF URINE CREATININE: CPT | Mod: ,,, | Performed by: CLINICAL MEDICAL LABORATORY

## 2025-01-09 PROCEDURE — 3077F SYST BP >= 140 MM HG: CPT | Mod: ,,, | Performed by: NURSE PRACTITIONER

## 2025-01-09 PROCEDURE — 80053 COMPREHEN METABOLIC PANEL: CPT | Mod: ,,, | Performed by: CLINICAL MEDICAL LABORATORY

## 2025-01-09 PROCEDURE — 3288F FALL RISK ASSESSMENT DOCD: CPT | Mod: ,,, | Performed by: NURSE PRACTITIONER

## 2025-01-09 RX ORDER — ROSUVASTATIN CALCIUM 10 MG/1
10 TABLET, COATED ORAL NIGHTLY
Qty: 90 TABLET | Refills: 1 | Status: SHIPPED | OUTPATIENT
Start: 2025-01-09

## 2025-01-09 RX ORDER — HYDROCHLOROTHIAZIDE 25 MG/1
25 TABLET ORAL DAILY
Qty: 90 TABLET | Refills: 1 | Status: SHIPPED | OUTPATIENT
Start: 2025-01-09

## 2025-01-09 RX ORDER — AMLODIPINE BESYLATE 10 MG/1
10 TABLET ORAL DAILY
Qty: 90 TABLET | Refills: 1 | Status: SHIPPED | OUTPATIENT
Start: 2025-01-09

## 2025-01-09 RX ORDER — ROSUVASTATIN CALCIUM 10 MG/1
10 TABLET, COATED ORAL NIGHTLY
COMMUNITY
Start: 2024-11-13 | End: 2025-01-09 | Stop reason: SDUPTHER

## 2025-01-09 RX ORDER — AMLODIPINE BESYLATE 10 MG/1
10 TABLET ORAL DAILY
COMMUNITY
Start: 2024-11-13 | End: 2025-01-09 | Stop reason: SDUPTHER

## 2025-01-09 NOTE — PROGRESS NOTES
DRISS Lowe   [facility] [address] [place] [address]  [phone]      PATIENT NAME: Kory Douglas  : 1955  DATE: 25  MRN: [id number]      Billing Provider: DRISS Lowe  Level of Service:   Patient PCP Information       Provider PCP Type    DRISS Lowe General            Reason for Visit / Chief Complaint: Follow-up, Hypertension, and Hyperlipidemia       Update PCP  Update Chief Complaint         History of Present Illness / Problem Focused Workflow     Kory Douglas presents to the clinic with Follow-up, Hypertension, and Hyperlipidemia     Pt presents for routine follow up with lab and med refills. Overall doing well.      BP appears not at goal today, typically well controlled. Home meds reviewed.   Advised to monitor BP at home. Advised on optimal BP readings - SBP < 130 & DBP < 80. Advised to call office for any persistent BP elevation and may have to prescribe or adjust BP med(s).  Recommended DASH diet, stay well hydrated with water daily, eliminate or decrease caffeinated and high calorie drinks, increase physical activity, and lose weight if BMI > 25.0. Written patient education information provided to patient with goals and recommendations to assist with BP management. Pt to follow up in 2 weeks for bp check.     Discussed need for low fat/low cholesterol diet, regular exercise, and weight control.   Cardiovascular risk and specific lipid/LDL goals reviewed.        Review of Systems     Review of Systems   Constitutional:  Negative for fatigue and fever.   HENT:  Negative for nasal congestion and sore throat.    Eyes:  Negative for visual disturbance.   Respiratory:  Negative for chest tightness and shortness of breath.    Cardiovascular:  Negative for chest pain and leg swelling.   Gastrointestinal:  Negative for abdominal pain and change in bowel habit.   Endocrine:  Negative for polydipsia, polyphagia and polyuria.   Genitourinary:  Negative for dysuria and hematuria.   Musculoskeletal:  Negative for back pain and leg pain.   Integumentary:  Negative for rash.   Neurological:  Negative for dizziness, syncope, weakness and light-headedness.        Medical / Social / Family History     Past Medical History:   Diagnosis Date    Cancer of prostate 12/16/2021     radical prostatectomy for [place]'s 4+4 disease and tertiary pattern of Clari's 5 disease. The patient's tumor invades the bladder neck in the resection margins are positive areas. did not get XRT.    Erectile dysfunction after radical prostatectomy 12/16/2021    No meds tried to date Reviewed all methods of ED treatment    Hyperlipidemia     Hypertension     Personal history of colonic polyps 06/19/2019       Past Surgical History:   Procedure Laterality Date    ADENOIDECTOMY      COLONOSCOPY W/ BIOPSIES  06/19/2019    PROSTATE SURGERY         Social History  Mr. Douglas  reports that he has never smoked. He has been exposed to tobacco smoke. He has never used smokeless tobacco. He reports that he does not drink alcohol and does not use drugs.    Family History  Mr. Douglas's family history includes No Known Problems in his father and mother.    Medications and Allergies     Medications  Outpatient Medications Marked as Taking for the 1/9/25 encounter (Office Visit) with Evelina Gongora ACNP   Medication Sig Dispense Refill    [DISCONTINUED] amLODIPine (NORVASC) 10 MG tablet Take 10 mg by mouth once daily.      [DISCONTINUED] rosuvastatin (CRESTOR) 10 MG tablet Take 10 mg by mouth every evening.         Allergies  Review of patient's allergies indicates:  No Known Allergies    Physical Examination     Vitals:    01/09/25 0834   BP: (!) 156/79   Pulse:    Resp:    Temp:      Physical Exam  Eyes:      Pupils: Pupils are equal, round, and reactive to light.   Cardiovascular:      Rate and Rhythm: Normal rate and regular  rhythm.      Heart sounds: Normal heart sounds. No murmur heard.  Pulmonary:      Breath sounds: Normal breath sounds. No wheezing, rhonchi or rales.   Abdominal:      General: Bowel sounds are normal.      Palpations: Abdomen is soft.   Musculoskeletal:         General: No swelling.      Cervical back: Normal range of motion and neck supple.   Skin:     General: Skin is warm and dry.   Neurological:      Mental Status: He is alert and oriented to person, place, and time.          Lab Results   Component Value Date    WBC 7.97 07/10/2023    HGB 13.1 (L) 07/10/2023    HCT 43.1 07/10/2023    MCV 90.9 07/10/2023     07/10/2023        Sodium   Date Value Ref Range Status   07/08/2024 140 136 - 145 mmol/L Final     Potassium   Date Value Ref Range Status   07/08/2024 4.0 3.5 - 5.1 mmol/L Final     Chloride   Date Value Ref Range Status   07/08/2024 106 98 - 107 mmol/L Final     CO2   Date Value Ref Range Status   07/08/2024 27 21 - 32 mmol/L Final     Glucose   Date Value Ref Range Status   07/08/2024 100 74 - 106 mg/dL Final     BUN   Date Value Ref Range Status   07/08/2024 13 7 - 18 mg/dL Final     Creatinine   Date Value Ref Range Status   07/08/2024 0.94 0.70 - 1.30 mg/dL Final     Calcium   Date Value Ref Range Status   07/08/2024 8.6 8.5 - 10.1 mg/dL Final     Total Protein   Date Value Ref Range Status   07/08/2024 7.3 6.4 - 8.2 g/dL Final     Albumin   Date Value Ref Range Status   07/08/2024 3.8 3.5 - 5.0 g/dL Final     Bilirubin, Total   Date Value Ref Range Status   07/08/2024 0.7 >0.0 - 1.2 mg/dL Final     Alk Phos   Date Value Ref Range Status   07/08/2024 64 45 - 115 U/L Final     AST   Date Value Ref Range Status   07/08/2024 19 15 - 37 U/L Final     ALT   Date Value Ref Range Status   07/08/2024 26 16 - 61 U/L Final     Anion Gap   Date Value Ref Range Status   07/08/2024 11 7 - 16 mmol/L Final     eGFR   Date Value Ref Range Status   07/08/2024 87 >=60 mL/min/1.73m2 Final      Lab Results  "  Component Value Date    HGBA1C 5.4 01/08/2024      Lab Results   Component Value Date    CHOL 181 07/08/2024    CHOL 151 01/08/2024    CHOL 105 07/10/2023     Lab Results   Component Value Date    HDL 40 07/08/2024    HDL 39 (L) 01/08/2024    HDL 40 07/10/2023     Lab Results   Component Value Date    LDLCALC 114 07/08/2024    LDLCALC 92 01/08/2024    LDLCALC 49 07/10/2023     No results found for: "DLDL"  Lab Results   Component Value Date    TRIG 134 07/08/2024    TRIG 99 01/08/2024    TRIG 81 07/10/2023     Lab Results   Component Value Date    CHOLHDL 4.5 07/08/2024    CHOLHDL 3.9 01/08/2024    CHOLHDL 2.6 07/10/2023      Lab Results   Component Value Date    TSH 0.793 02/07/2022        Assessment and Plan (including Health Maintenance)      Problem List  Smart Sets  Document Outside HM   :    Plan:     1. Essential hypertension  Assessment & Plan:  BP Readings from Last 3 Encounters:   01/09/25 (!) 156/79   11/12/24 124/67   07/08/24 124/77    The current medical regimen is effective;  continue present plan and medications.      Orders:  -     Comprehensive Metabolic Panel  -     Microalbumin/Creatinine Ratio, Urine  -     hydroCHLOROthiazide (HYDRODIURIL) 25 MG tablet; Take 1 tablet (25 mg total) by mouth once daily.  Dispense: 90 tablet; Refill: 1    2. Hyperlipidemia, unspecified hyperlipidemia type  Assessment & Plan:  Lab Results   Component Value Date    CHOL 181 07/08/2024    CHOL 151 01/08/2024    CHOL 105 07/10/2023     Lab Results   Component Value Date    HDL 40 07/08/2024    HDL 39 (L) 01/08/2024    HDL 40 07/10/2023     Lab Results   Component Value Date    LDLCALC 114 07/08/2024    LDLCALC 92 01/08/2024    LDLCALC 49 07/10/2023     Lab Results   Component Value Date    TRIG 134 07/08/2024    TRIG 99 01/08/2024    TRIG 81 07/10/2023       Lab Results   Component Value Date    CHOLHDL 4.5 07/08/2024    CHOLHDL 3.9 01/08/2024    CHOLHDL 2.6 07/10/2023    The current medical regimen is effective;  " continue present plan and medications.      Orders:  -     Lipid Panel    3. History of prostate cancer  -     PSA, Screening    4. Screening for diabetes mellitus  -     Hemoglobin A1C; Future; Expected date: 01/09/2025    5. Hyperglycemia  -     Hemoglobin A1C; Future; Expected date: 01/09/2025    6. Encounter for screening for malignant neoplasm of prostate  -     PSA, Screening    7. Cancer of prostate  Overview:   radical prostatectomy for [place]'s 4+4 disease and tertiary pattern of Clari's 5 disease. The patient's tumor invades the bladder neck in the resection margins are positive areas.  did not get XRT.    Orders:  -     PSA, Total (Diagnostic)    Other orders  -     amLODIPine (NORVASC) 10 MG tablet; Take 1 tablet (10 mg total) by mouth once daily.  Dispense: 90 tablet; Refill: 1  -     rosuvastatin (CRESTOR) 10 MG tablet; Take 1 tablet (10 mg total) by mouth every evening.  Dispense: 90 tablet; Refill: 1         There are no Patient Instructions on file for this visit.     Health Maintenance Due   Topic Date Due    Diabetes Urine Screening  01/08/2025    Hemoglobin A1c  01/08/2025         Health Maintenance Topics with due status: Not Due       Topic Last Completion Date    TETANUS VACCINE 03/25/2023    PROSTATE-SPECIFIC ANTIGEN 01/24/2024    Lipid Panel 07/08/2024    Colorectal Cancer Screening 11/12/2024       Future Appointments   Date Time Provider Department Center   7/10/2025  8:00 AM Evelina Gongora ACNP [facility] [place]   7/29/2025 11:00 AM AWV NURSE, [facility] [place]            Signature:  [facility] [address] [place] [address]  [phone]    Date of encounter: 1/9/25

## 2025-01-09 NOTE — ASSESSMENT & PLAN NOTE
Lab Results   Component Value Date    CHOL 181 07/08/2024    CHOL 151 01/08/2024    CHOL 105 07/10/2023     Lab Results   Component Value Date    HDL 40 07/08/2024    HDL 39 (L) 01/08/2024    HDL 40 07/10/2023     Lab Results   Component Value Date    LDLCALC 114 07/08/2024    LDLCALC 92 01/08/2024    LDLCALC 49 07/10/2023     Lab Results   Component Value Date    TRIG 134 07/08/2024    TRIG 99 01/08/2024    TRIG 81 07/10/2023       Lab Results   Component Value Date    CHOLHDL 4.5 07/08/2024    CHOLHDL 3.9 01/08/2024    CHOLHDL 2.6 07/10/2023    The current medical regimen is effective;  continue present plan and medications.

## 2025-01-09 NOTE — ASSESSMENT & PLAN NOTE
BP Readings from Last 3 Encounters:   01/09/25 (!) 156/79   11/12/24 124/67   07/08/24 124/77    The current medical regimen is effective;  continue present plan and medications.

## 2025-01-10 ENCOUNTER — TELEPHONE (OUTPATIENT)
Dept: FAMILY MEDICINE | Facility: CLINIC | Age: 70
End: 2025-01-10
Payer: MEDICARE

## 2025-01-10 NOTE — TELEPHONE ENCOUNTER
----- Message from Tech Laturina sent at 1/10/2025  9:11 AM CST -----  Who Called: Kory Douglas    Patient is returning phone call    Who Left Message for Patient:  Does the patient know what this is regarding?:      Preferred Method of Contact: Phone Call  Patient's Preferred Phone Number on File: [phone]   Best Call Back Number, if different:  Additional Information: patient said someone called he was trying to see if it was about his blood work, I did not see a phone encounter and he said a v/m was not left

## 2025-01-14 ENCOUNTER — OFFICE VISIT (OUTPATIENT)
Dept: UROLOGY | Facility: CLINIC | Age: 70
End: 2025-01-14
Payer: MEDICARE

## 2025-01-14 VITALS
SYSTOLIC BLOOD PRESSURE: 132 MMHG | RESPIRATION RATE: 16 BRPM | HEART RATE: 76 BPM | BODY MASS INDEX: 28.5 KG/M2 | TEMPERATURE: 99 F | WEIGHT: 193 LBS | DIASTOLIC BLOOD PRESSURE: 71 MMHG

## 2025-01-14 DIAGNOSIS — R35.1 NOCTURIA: ICD-10-CM

## 2025-01-14 DIAGNOSIS — C61 CANCER OF PROSTATE: Primary | Chronic | ICD-10-CM

## 2025-01-14 DIAGNOSIS — R35.0 FREQUENCY OF URINATION: ICD-10-CM

## 2025-01-14 DIAGNOSIS — N52.31 ERECTILE DYSFUNCTION AFTER RADICAL PROSTATECTOMY: Chronic | ICD-10-CM

## 2025-01-14 PROCEDURE — 1160F RVW MEDS BY RX/DR IN RCRD: CPT | Mod: CPTII,,, | Performed by: NURSE PRACTITIONER

## 2025-01-14 PROCEDURE — 99214 OFFICE O/P EST MOD 30 MIN: CPT | Mod: S$PBB,,, | Performed by: NURSE PRACTITIONER

## 2025-01-14 PROCEDURE — 3078F DIAST BP <80 MM HG: CPT | Mod: CPTII,,, | Performed by: NURSE PRACTITIONER

## 2025-01-14 PROCEDURE — 99999 PR PBB SHADOW E&M-EST. PATIENT-LVL IV: CPT | Mod: PBBFAC,,, | Performed by: NURSE PRACTITIONER

## 2025-01-14 PROCEDURE — 99214 OFFICE O/P EST MOD 30 MIN: CPT | Mod: PBBFAC | Performed by: NURSE PRACTITIONER

## 2025-01-14 PROCEDURE — 3066F NEPHROPATHY DOC TX: CPT | Mod: CPTII,,, | Performed by: NURSE PRACTITIONER

## 2025-01-14 PROCEDURE — 3008F BODY MASS INDEX DOCD: CPT | Mod: CPTII,,, | Performed by: NURSE PRACTITIONER

## 2025-01-14 PROCEDURE — 3061F NEG MICROALBUMINURIA REV: CPT | Mod: CPTII,,, | Performed by: NURSE PRACTITIONER

## 2025-01-14 PROCEDURE — 3075F SYST BP GE 130 - 139MM HG: CPT | Mod: CPTII,,, | Performed by: NURSE PRACTITIONER

## 2025-01-14 PROCEDURE — 1159F MED LIST DOCD IN RCRD: CPT | Mod: CPTII,,, | Performed by: NURSE PRACTITIONER

## 2025-01-14 PROCEDURE — 3044F HG A1C LEVEL LT 7.0%: CPT | Mod: CPTII,,, | Performed by: NURSE PRACTITIONER

## 2025-01-14 NOTE — PATIENT INSTRUCTIONS
1. PSA in 6 months  2. Patient is pleased with the way he voids and desires no intervention at this time  3. Patient desires no intervention for his ED  4. Follow-up with urology in 6 months

## 2025-01-14 NOTE — PROGRESS NOTES
Subjective     Patient ID: Kory Douglas is a 69 y.o. male.    Chief Complaint:  Follow-up prostate cancer    This pleasant 69 year old male presents to the clinic for follow up of Prostate Cancer and ED s/p radical prostatectomy. Patient states he is doing great and denies any new Urological complaints. He is pleased with the way he is voiding and desires no intervention for the ED.  He desires to continue the current management. He was previously on Viagra but stopped this medication due to lack of benefit. We discussed the penile vacuum device versus penile implant at the last visit and again today but he desires again no further intervention for the ED.  His PVR today is 000 mls.  He does report incomplete bladder emptying, frequency and nocturia 2 times a night.  He denies dysuria, hematuria or straining to urinate.  He denies fever, chills, nausea, or vomiting.  He denies any  pains.  He reports having a radical prostatectomy in 2019 with a Clari score of 4+4=8. Records indicates Tumor invades the bladder neck in the resection margins are positive areas. Records also indicate 18 months of ADT and No XRT to date. His Bone Scan done in June 2022  Showed Increased uptake of the lower thoracic and lumbar spine than seen on previous study seen. He declined imaging at that time and chose to follow up with PCP for plain films of thoracic and lumbar spine to exclude sclerotic  mass. He reports the imaging was done and was told it was normal.  Through shared decision-making with the patient, he desires no intervention for the erectile dysfunction or voiding symptoms.  He desires to continue to monitor the PSA every 6 months.  We will see the patient back in the clinic in 6 months with a PSA or sooner if needed.  I discussed the plan in detail with the patient and he is in agreement with the plan.  All his questions were answered at today's visit.  I spent 30 minutes counseling this patient, reviewing the chart, and  labs.      PSA history:   PSA on 01/09/25 was     0.000  PSA on 01/24/24 was   <0.010  PSA on 06-26-23 was   <0.010  PSA on 12-16-22 was   <0.010  PSA on 07-21-22 was   <0.010   PSA on 06-17-22 was   <0.010  PSA on 12-10-21 was   <0.010   PSA on 05-10-21 was   <0.010  PSA on 02-01-21 was   <0.010  PSA on 10-30-20 was   <0.010   PSA on 01-20-20 was   <0.010   ----------------------------------------------------------------------------------------------------------------------------------------------------------------  [January 14, 2025].          Review of Systems   Constitutional:  Negative for activity change and fever.   HENT:  Negative for hearing loss and trouble swallowing.    Eyes:  Negative for visual disturbance.   Respiratory:  Negative for cough, shortness of breath and wheezing.    Cardiovascular:  Negative for chest pain.   Gastrointestinal:  Negative for abdominal pain, diarrhea, nausea and vomiting.   Endocrine: Negative for polyuria.   Genitourinary:  Positive for erectile dysfunction and frequency. Negative for bladder incontinence, decreased urine volume, difficulty urinating, discharge, dysuria, enuresis, flank pain, genital sores, hematuria, penile pain, penile swelling, scrotal swelling, testicular pain and urgency.        Prostate cancer       Nocturia   Musculoskeletal:  Negative for back pain and gait problem.   Integumentary:  Negative for rash.   Neurological:  Negative for speech difficulty and weakness.   Psychiatric/Behavioral:  Negative for behavioral problems and confusion.           Objective     Physical Exam  Vitals and nursing note reviewed.   Constitutional:       General: He is not in acute distress.     Appearance: Normal appearance. He is not ill-appearing, toxic-appearing or diaphoretic.   HENT:      Head: Normocephalic.   Eyes:      Extraocular Movements: Extraocular movements intact.   Cardiovascular:      Rate and Rhythm: Normal rate and regular rhythm.      Heart sounds:  Normal heart sounds.   Pulmonary:      Effort: Pulmonary effort is normal. No respiratory distress.      Breath sounds: Normal breath sounds. No wheezing, rhonchi or rales.   Abdominal:      General: Bowel sounds are normal.      Palpations: Abdomen is soft.      Tenderness: There is no abdominal tenderness. There is no right CVA tenderness, left CVA tenderness, guarding or rebound.   Musculoskeletal:         General: Normal range of motion.      Cervical back: Normal range of motion. No rigidity.   Skin:     General: Skin is warm and dry.   Neurological:      General: No focal deficit present.      Mental Status: He is alert and oriented to person, place, and time.      Motor: No weakness.      Coordination: Coordination normal.      Gait: Gait normal.   Psychiatric:         Mood and Affect: Mood normal.         Behavior: Behavior normal.         Thought Content: Thought content normal.          Assessment and Plan     1. Cancer of prostate  Overview:   radical prostatectomy for [place]'s 4+4 disease and tertiary pattern of Clari's 5 disease. The patient's tumor invades the bladder neck in the resection margins are positive areas.  did not get XRT.    Orders:  -     PSA, Total (Diagnostic); Future; Expected date: 07/14/2025    2. Erectile dysfunction after radical prostatectomy  Overview:  No meds tried to date  Reviewed all methods of ED treatment  Not currently taking nitrates.      3. Nocturia    4. Frequency of urination             1. PSA in 6 months  2. Patient is pleased with the way he voids and desires no intervention at this time  3. Patient desires no intervention for his ED  4. Follow-up with urology in 6 months

## 2025-07-10 ENCOUNTER — OFFICE VISIT (OUTPATIENT)
Dept: FAMILY MEDICINE | Facility: CLINIC | Age: 70
End: 2025-07-10
Payer: MEDICARE

## 2025-07-10 VITALS
OXYGEN SATURATION: 98 % | BODY MASS INDEX: 28.72 KG/M2 | HEIGHT: 69 IN | HEART RATE: 80 BPM | RESPIRATION RATE: 16 BRPM | WEIGHT: 193.88 LBS | DIASTOLIC BLOOD PRESSURE: 89 MMHG | TEMPERATURE: 99 F | SYSTOLIC BLOOD PRESSURE: 169 MMHG

## 2025-07-10 DIAGNOSIS — E78.5 HYPERLIPIDEMIA, UNSPECIFIED HYPERLIPIDEMIA TYPE: ICD-10-CM

## 2025-07-10 DIAGNOSIS — I10 ESSENTIAL HYPERTENSION: Primary | ICD-10-CM

## 2025-07-10 LAB
ALBUMIN SERPL BCP-MCNC: 4.1 G/DL (ref 3.4–4.8)
ALBUMIN/GLOB SERPL: 1.4 {RATIO}
ALP SERPL-CCNC: 63 U/L (ref 40–150)
ALT SERPL W P-5'-P-CCNC: 21 U/L
ANION GAP SERPL CALCULATED.3IONS-SCNC: 14 MMOL/L (ref 7–16)
AST SERPL W P-5'-P-CCNC: 23 U/L (ref 11–45)
BASOPHILS # BLD AUTO: 0.08 K/UL (ref 0–0.2)
BASOPHILS NFR BLD AUTO: 1 % (ref 0–1)
BILIRUB SERPL-MCNC: 1 MG/DL
BUN SERPL-MCNC: 17 MG/DL (ref 8–26)
BUN/CREAT SERPL: 21 (ref 6–20)
CALCIUM SERPL-MCNC: 9 MG/DL (ref 8.8–10)
CHLORIDE SERPL-SCNC: 106 MMOL/L (ref 98–107)
CHOLEST SERPL-MCNC: 169 MG/DL
CHOLEST/HDLC SERPL: 4.8 {RATIO}
CO2 SERPL-SCNC: 26 MMOL/L (ref 23–31)
CREAT SERPL-MCNC: 0.82 MG/DL (ref 0.72–1.25)
DIFFERENTIAL METHOD BLD: ABNORMAL
EGFR (NO RACE VARIABLE) (RUSH/TITUS): 94 ML/MIN/1.73M2
EOSINOPHIL # BLD AUTO: 0.09 K/UL (ref 0–0.5)
EOSINOPHIL NFR BLD AUTO: 1.1 % (ref 1–4)
ERYTHROCYTE [DISTWIDTH] IN BLOOD BY AUTOMATED COUNT: 14 % (ref 11.5–14.5)
GLOBULIN SER-MCNC: 2.9 G/DL (ref 2–4)
GLUCOSE SERPL-MCNC: 89 MG/DL (ref 82–115)
HCT VFR BLD AUTO: 43.5 % (ref 40–54)
HDLC SERPL-MCNC: 35 MG/DL (ref 35–60)
HGB BLD-MCNC: 13.5 G/DL (ref 13.5–18)
IMM GRANULOCYTES # BLD AUTO: 0.02 K/UL (ref 0–0.04)
IMM GRANULOCYTES NFR BLD: 0.2 % (ref 0–0.4)
LDLC SERPL CALC-MCNC: 111 MG/DL
LDLC/HDLC SERPL: 3.2 {RATIO}
LYMPHOCYTES # BLD AUTO: 3.37 K/UL (ref 1–4.8)
LYMPHOCYTES NFR BLD AUTO: 42 % (ref 27–41)
MCH RBC QN AUTO: 28.5 PG (ref 27–31)
MCHC RBC AUTO-ENTMCNC: 31 G/DL (ref 32–36)
MCV RBC AUTO: 92 FL (ref 80–96)
MONOCYTES # BLD AUTO: 0.42 K/UL (ref 0–0.8)
MONOCYTES NFR BLD AUTO: 5.2 % (ref 2–6)
MPC BLD CALC-MCNC: 11 FL (ref 9.4–12.4)
NEUTROPHILS # BLD AUTO: 4.04 K/UL (ref 1.8–7.7)
NEUTROPHILS NFR BLD AUTO: 50.5 % (ref 53–65)
NONHDLC SERPL-MCNC: 134 MG/DL
NRBC # BLD AUTO: 0 X10E3/UL
NRBC, AUTO (.00): 0 %
PLATELET # BLD AUTO: 262 K/UL (ref 150–400)
POTASSIUM SERPL-SCNC: 4.1 MMOL/L (ref 3.5–5.1)
PROT SERPL-MCNC: 7 G/DL (ref 5.8–7.6)
RBC # BLD AUTO: 4.73 M/UL (ref 4.6–6.2)
SODIUM SERPL-SCNC: 142 MMOL/L (ref 136–145)
TRIGL SERPL-MCNC: 117 MG/DL (ref 34–140)
VLDLC SERPL-MCNC: 23 MG/DL
WBC # BLD AUTO: 8.02 K/UL (ref 4.5–11)

## 2025-07-10 PROCEDURE — 80061 LIPID PANEL: CPT | Mod: ,,, | Performed by: CLINICAL MEDICAL LABORATORY

## 2025-07-10 PROCEDURE — 80053 COMPREHEN METABOLIC PANEL: CPT | Mod: ,,, | Performed by: CLINICAL MEDICAL LABORATORY

## 2025-07-10 PROCEDURE — 3044F HG A1C LEVEL LT 7.0%: CPT | Mod: ,,, | Performed by: NURSE PRACTITIONER

## 2025-07-10 PROCEDURE — 1101F PT FALLS ASSESS-DOCD LE1/YR: CPT | Mod: ,,, | Performed by: NURSE PRACTITIONER

## 2025-07-10 PROCEDURE — 3066F NEPHROPATHY DOC TX: CPT | Mod: ,,, | Performed by: NURSE PRACTITIONER

## 2025-07-10 PROCEDURE — 3008F BODY MASS INDEX DOCD: CPT | Mod: ,,, | Performed by: NURSE PRACTITIONER

## 2025-07-10 PROCEDURE — 3061F NEG MICROALBUMINURIA REV: CPT | Mod: ,,, | Performed by: NURSE PRACTITIONER

## 2025-07-10 PROCEDURE — 85025 COMPLETE CBC W/AUTO DIFF WBC: CPT | Mod: ,,, | Performed by: CLINICAL MEDICAL LABORATORY

## 2025-07-10 PROCEDURE — 1126F AMNT PAIN NOTED NONE PRSNT: CPT | Mod: ,,, | Performed by: NURSE PRACTITIONER

## 2025-07-10 PROCEDURE — 99214 OFFICE O/P EST MOD 30 MIN: CPT | Mod: ,,, | Performed by: NURSE PRACTITIONER

## 2025-07-10 PROCEDURE — 1159F MED LIST DOCD IN RCRD: CPT | Mod: ,,, | Performed by: NURSE PRACTITIONER

## 2025-07-10 PROCEDURE — 1160F RVW MEDS BY RX/DR IN RCRD: CPT | Mod: ,,, | Performed by: NURSE PRACTITIONER

## 2025-07-10 PROCEDURE — 3077F SYST BP >= 140 MM HG: CPT | Mod: ,,, | Performed by: NURSE PRACTITIONER

## 2025-07-10 PROCEDURE — 3079F DIAST BP 80-89 MM HG: CPT | Mod: ,,, | Performed by: NURSE PRACTITIONER

## 2025-07-10 PROCEDURE — 3288F FALL RISK ASSESSMENT DOCD: CPT | Mod: ,,, | Performed by: NURSE PRACTITIONER

## 2025-07-10 RX ORDER — ROSUVASTATIN CALCIUM 10 MG/1
10 TABLET, COATED ORAL NIGHTLY
Qty: 90 TABLET | Refills: 1 | Status: SHIPPED | OUTPATIENT
Start: 2025-07-10

## 2025-07-10 RX ORDER — AMLODIPINE BESYLATE 10 MG/1
10 TABLET ORAL DAILY
Qty: 90 TABLET | Refills: 1 | Status: SHIPPED | OUTPATIENT
Start: 2025-07-10

## 2025-07-10 RX ORDER — HYDROCHLOROTHIAZIDE 25 MG/1
25 TABLET ORAL DAILY
Qty: 90 TABLET | Refills: 1 | Status: SHIPPED | OUTPATIENT
Start: 2025-07-10

## 2025-07-10 NOTE — ASSESSMENT & PLAN NOTE
BP Readings from Last 3 Encounters:   07/10/25 (!) 169/89   01/14/25 132/71   01/09/25 (!) 156/79    Goal less 130/80  Follow up 2 weeks bp check

## 2025-07-10 NOTE — ASSESSMENT & PLAN NOTE
"Lab Results   Component Value Date    CHOL 175 01/09/2025    CHOL 181 07/08/2024    CHOL 151 01/08/2024      Lab Results   Component Value Date    HDL 39 01/09/2025    HDL 40 07/08/2024    HDL 39 (L) 01/08/2024     Lab Results   Component Value Date    LDLCALC 116 01/09/2025    LDLCALC 114 07/08/2024    LDLCALC 92 01/08/2024      Lab Results   Component Value Date    TRIG 101 01/09/2025    TRIG 134 07/08/2024    TRIG 99 01/08/2024     No results found for: "TOTALCHOLEST"  Lab Results   Component Value Date    NONHDLCHOL 136 01/09/2025    NONHDLCHOL 141 07/08/2024    NONHDLCHOL 112 01/08/2024     Lab Results   Component Value Date    CHOLHDL 4.5 01/09/2025    CHOLHDL 4.5 07/08/2024    CHOLHDL 3.9 01/08/2024    The current medical regimen is effective;  continue present plan and medications.  Cont crestor   "

## 2025-07-21 ENCOUNTER — OFFICE VISIT (OUTPATIENT)
Dept: UROLOGY | Facility: CLINIC | Age: 70
End: 2025-07-21
Payer: MEDICARE

## 2025-07-21 VITALS
SYSTOLIC BLOOD PRESSURE: 152 MMHG | HEIGHT: 69 IN | BODY MASS INDEX: 28.58 KG/M2 | WEIGHT: 193 LBS | DIASTOLIC BLOOD PRESSURE: 90 MMHG | OXYGEN SATURATION: 98 % | HEART RATE: 87 BPM

## 2025-07-21 DIAGNOSIS — C61 CANCER OF PROSTATE: Primary | Chronic | ICD-10-CM

## 2025-07-21 PROCEDURE — 99213 OFFICE O/P EST LOW 20 MIN: CPT | Mod: PBBFAC | Performed by: UROLOGY

## 2025-07-21 PROCEDURE — 99999 PR PBB SHADOW E&M-EST. PATIENT-LVL III: CPT | Mod: PBBFAC,,, | Performed by: UROLOGY

## 2025-07-21 PROCEDURE — 1159F MED LIST DOCD IN RCRD: CPT | Mod: CPTII,,, | Performed by: UROLOGY

## 2025-07-21 PROCEDURE — 3288F FALL RISK ASSESSMENT DOCD: CPT | Mod: CPTII,,, | Performed by: UROLOGY

## 2025-07-21 PROCEDURE — 3077F SYST BP >= 140 MM HG: CPT | Mod: CPTII,,, | Performed by: UROLOGY

## 2025-07-21 PROCEDURE — 3061F NEG MICROALBUMINURIA REV: CPT | Mod: CPTII,,, | Performed by: UROLOGY

## 2025-07-21 PROCEDURE — 3044F HG A1C LEVEL LT 7.0%: CPT | Mod: CPTII,,, | Performed by: UROLOGY

## 2025-07-21 PROCEDURE — 3080F DIAST BP >= 90 MM HG: CPT | Mod: CPTII,,, | Performed by: UROLOGY

## 2025-07-21 PROCEDURE — 3008F BODY MASS INDEX DOCD: CPT | Mod: CPTII,,, | Performed by: UROLOGY

## 2025-07-21 PROCEDURE — 1101F PT FALLS ASSESS-DOCD LE1/YR: CPT | Mod: CPTII,,, | Performed by: UROLOGY

## 2025-07-21 PROCEDURE — 1160F RVW MEDS BY RX/DR IN RCRD: CPT | Mod: CPTII,,, | Performed by: UROLOGY

## 2025-07-21 PROCEDURE — 99213 OFFICE O/P EST LOW 20 MIN: CPT | Mod: S$PBB,,, | Performed by: UROLOGY

## 2025-07-21 PROCEDURE — 3066F NEPHROPATHY DOC TX: CPT | Mod: CPTII,,, | Performed by: UROLOGY

## 2025-07-22 ENCOUNTER — TELEPHONE (OUTPATIENT)
Dept: UROLOGY | Facility: CLINIC | Age: 70
End: 2025-07-22
Payer: MEDICARE

## 2025-07-22 NOTE — PROGRESS NOTES
Assessment:   1. Cancer of prostate  Overview:   radical prostatectomy for [place]'s 4+4 disease and tertiary pattern of Clari's 5 disease. The patient's tumor invades the bladder neck in the resection margins are positive areas.  did not get XRT.           Plan:     Continue observation. Repeat PSA in 6 months.           Tayo Gardner MD  Urology  07/21/2025          UROLOGY HISTORY AND PHYSICAL EXAM    Subjective:      Patient ID: Kory Douglas is a 70 y.o. male.    Chief Complaint::   Follow-up    The patient is a 70-year-old male with a history of prostate cancer.  He underwent a radical prostatectomy and was found to have positive margins.  The patient presents today for follow-up and had his PSA drawn today.  We do not have the value.  He is not having any urinary symptoms he has occasional stress incontinence.  He has no straining to void and no sensation of incomplete bladder emptying.       Past Medical History:   Diagnosis Date    Cancer of prostate 12/16/2021     radical prostatectomy for [place]'s 4+4 disease and tertiary pattern of Clari's 5 disease. The patient's tumor invades the bladder neck in the resection margins are positive areas. did not get XRT.    Erectile dysfunction after radical prostatectomy 12/16/2021    No meds tried to date Reviewed all methods of ED treatment    Hyperlipidemia     Hypertension     Personal history of colonic polyps 06/19/2019     Past Surgical History:   Procedure Laterality Date    ADENOIDECTOMY      COLONOSCOPY W/ BIOPSIES  06/19/2019    PROSTATE SURGERY          Medications Ordered Prior to Encounter[1]     Review of patient's allergies indicates:  No Known Allergies  Vitals:    07/21/25 0743   BP: (!) 152/90   Pulse: 87          Review of Systems   Constitutional:  Negative for activity change.   Genitourinary:  Negative for difficulty urinating.      Objective:     Physical Exam  Vitals and nursing note reviewed.   Constitutional:       Appearance:  Normal appearance. He is normal weight.   HENT:      Head: Normocephalic and atraumatic.   Eyes:      General: No scleral icterus.     Conjunctiva/sclera: Conjunctivae normal.   Cardiovascular:      Rate and Rhythm: Normal rate.   Abdominal:      General: There is no distension.      Tenderness: There is no abdominal tenderness.   Skin:     General: Skin is warm and dry.      Findings: No rash.   Neurological:      General: No focal deficit present.      Mental Status: He is alert.   Psychiatric:         Mood and Affect: Mood normal.         Behavior: Behavior normal.         Thought Content: Thought content normal.         Judgment: Judgment normal.        Lab Results   Component Value Date    PSA 0.003 07/21/2025    PSA 0.000 01/09/2025    PSA <0.010 01/24/2024        CMP  Sodium   Date Value Ref Range Status   07/10/2025 142 136 - 145 mmol/L Final     Potassium   Date Value Ref Range Status   07/10/2025 4.1 3.5 - 5.1 mmol/L Final     Chloride   Date Value Ref Range Status   07/10/2025 106 98 - 107 mmol/L Final     CO2   Date Value Ref Range Status   07/10/2025 26 23 - 31 mmol/L Final     Glucose   Date Value Ref Range Status   07/10/2025 89 82 - 115 mg/dL Final     BUN   Date Value Ref Range Status   07/10/2025 17 8 - 26 mg/dL Final     Creatinine   Date Value Ref Range Status   07/10/2025 0.82 0.72 - 1.25 mg/dL Final     Calcium   Date Value Ref Range Status   07/10/2025 9.0 8.8 - 10.0 mg/dL Final     Total Protein   Date Value Ref Range Status   07/10/2025 7.0 5.8 - 7.6 g/dL Final     Albumin   Date Value Ref Range Status   07/10/2025 4.1 3.4 - 4.8 g/dL Final     Bilirubin, Total   Date Value Ref Range Status   07/10/2025 1.0 <=1.5 mg/dL Final     Alk Phos   Date Value Ref Range Status   07/10/2025 63 40 - 150 U/L Final     AST   Date Value Ref Range Status   07/10/2025 23 11 - 45 U/L Final     ALT   Date Value Ref Range Status   07/10/2025 21 <=55 U/L Final     Anion Gap   Date Value Ref Range Status    07/10/2025 14 7 - 16 mmol/L Final     eGFR   Date Value Ref Range Status   07/10/2025 94 >=60 mL/min/1.73m2 Final     Comment:     Estimated GFR calculated using the CKD-EPI creatinine (2021) equation.      BMP  Lab Results   Component Value Date     07/10/2025    K 4.1 07/10/2025     07/10/2025    CO2 26 07/10/2025    BUN 17 07/10/2025    CREATININE 0.82 07/10/2025    CALCIUM 9.0 07/10/2025    ANIONGAP 14 07/10/2025    EGFRNORACEVR 94 07/10/2025                [1]  Current Outpatient Medications on File Prior to Visit   Medication Sig Dispense Refill    amLODIPine (NORVASC) 10 MG tablet Take 1 tablet (10 mg total) by mouth once daily. 90 tablet 1    hydroCHLOROthiazide (HYDRODIURIL) 25 MG tablet Take 1 tablet (25 mg total) by mouth once daily. 90 tablet 1    rosuvastatin (CRESTOR) 10 MG tablet Take 1 tablet (10 mg total) by mouth every evening. 90 tablet 1     No current facility-administered medications on file prior to visit.

## 2025-07-22 NOTE — TELEPHONE ENCOUNTER
----- Message from Rico Garcia NP sent at 7/22/2025 11:14 AM CDT -----  Please notify patient his PSA was 0.003 and this is good.  Remind him to keep his six-month follow-up thanks  I called pt and spoke with him and relayed the above message to him.  He said he did see Dr Gardner yesterday and he said if he is doing good with the PSA he will push him out to once a year and gave him a 1 year appointment.  Told pt that is what is documented so do what Dr Gardner told him to do.  He voiced understanding.  I told him if he has problems before the 1 year appointment, just call urology back and we will get him in.  He voiced understanding.  ----- Message -----  From: Lab, Background User  Sent: 7/21/2025   9:43 AM CDT  To: Rico Garcia NP